# Patient Record
Sex: FEMALE | Race: WHITE | NOT HISPANIC OR LATINO | URBAN - METROPOLITAN AREA
[De-identification: names, ages, dates, MRNs, and addresses within clinical notes are randomized per-mention and may not be internally consistent; named-entity substitution may affect disease eponyms.]

---

## 2017-11-29 ENCOUNTER — APPOINTMENT (OUTPATIENT)
Dept: WOMENS IMAGING | Facility: HOSPITAL | Age: 72
End: 2017-11-29

## 2017-11-29 PROCEDURE — 77063 BREAST TOMOSYNTHESIS BI: CPT | Performed by: RADIOLOGY

## 2017-11-29 PROCEDURE — 77067 SCR MAMMO BI INCL CAD: CPT | Performed by: RADIOLOGY

## 2018-12-12 ENCOUNTER — APPOINTMENT (OUTPATIENT)
Dept: WOMENS IMAGING | Facility: HOSPITAL | Age: 73
End: 2018-12-12

## 2018-12-12 PROCEDURE — 77063 BREAST TOMOSYNTHESIS BI: CPT | Performed by: RADIOLOGY

## 2018-12-12 PROCEDURE — 77067 SCR MAMMO BI INCL CAD: CPT | Performed by: RADIOLOGY

## 2019-12-16 ENCOUNTER — APPOINTMENT (OUTPATIENT)
Dept: WOMENS IMAGING | Facility: HOSPITAL | Age: 74
End: 2019-12-16

## 2019-12-16 PROCEDURE — 77067 SCR MAMMO BI INCL CAD: CPT | Performed by: RADIOLOGY

## 2019-12-16 PROCEDURE — 77063 BREAST TOMOSYNTHESIS BI: CPT | Performed by: RADIOLOGY

## 2025-03-13 ENCOUNTER — APPOINTMENT (OUTPATIENT)
Dept: GENERAL RADIOLOGY | Facility: HOSPITAL | Age: 80
DRG: 194 | End: 2025-03-13
Payer: COMMERCIAL

## 2025-03-13 ENCOUNTER — HOSPITAL ENCOUNTER (INPATIENT)
Facility: HOSPITAL | Age: 80
LOS: 2 days | Discharge: HOME | DRG: 194 | End: 2025-03-16
Attending: STUDENT IN AN ORGANIZED HEALTH CARE EDUCATION/TRAINING PROGRAM | Admitting: STUDENT IN AN ORGANIZED HEALTH CARE EDUCATION/TRAINING PROGRAM
Payer: MEDICARE

## 2025-03-13 DIAGNOSIS — J44.1 ACUTE EXACERBATION OF CHRONIC OBSTRUCTIVE PULMONARY DISEASE (COPD): ICD-10-CM

## 2025-03-13 DIAGNOSIS — J96.21 ACUTE ON CHRONIC RESPIRATORY FAILURE WITH HYPOXIA: ICD-10-CM

## 2025-03-13 DIAGNOSIS — J18.9 PNEUMONIA OF LEFT LOWER LOBE DUE TO INFECTIOUS ORGANISM: Primary | ICD-10-CM

## 2025-03-13 DIAGNOSIS — R47.89 WORD FINDING DIFFICULTY: ICD-10-CM

## 2025-03-13 DIAGNOSIS — N17.9 AKI (ACUTE KIDNEY INJURY): ICD-10-CM

## 2025-03-13 DIAGNOSIS — R54 AGE-RELATED PHYSICAL DEBILITY: ICD-10-CM

## 2025-03-13 LAB
ALBUMIN SERPL-MCNC: 3.8 G/DL (ref 3.5–5.2)
ALBUMIN/GLOB SERPL: 1.3 G/DL
ALP SERPL-CCNC: 91 U/L (ref 39–117)
ALT SERPL W P-5'-P-CCNC: 12 U/L (ref 1–33)
ANION GAP SERPL CALCULATED.3IONS-SCNC: 11.5 MMOL/L (ref 5–15)
ARTERIAL PATENCY WRIST A: ABNORMAL
AST SERPL-CCNC: 18 U/L (ref 1–32)
ATMOSPHERIC PRESS: 746.9 MMHG
B PARAPERT DNA SPEC QL NAA+PROBE: NOT DETECTED
B PERT DNA SPEC QL NAA+PROBE: NOT DETECTED
BASE EXCESS BLDA CALC-SCNC: 2 MMOL/L (ref 0–2)
BASOPHILS # BLD AUTO: 0.03 10*3/MM3 (ref 0–0.2)
BASOPHILS NFR BLD AUTO: 0.2 % (ref 0–1.5)
BDY SITE: ABNORMAL
BILIRUB SERPL-MCNC: 1.6 MG/DL (ref 0–1.2)
BUN SERPL-MCNC: 34 MG/DL (ref 8–23)
BUN/CREAT SERPL: 23 (ref 7–25)
C PNEUM DNA NPH QL NAA+NON-PROBE: NOT DETECTED
CALCIUM SPEC-SCNC: 8.9 MG/DL (ref 8.6–10.5)
CHLORIDE SERPL-SCNC: 95 MMOL/L (ref 98–107)
CO2 SERPL-SCNC: 26.5 MMOL/L (ref 22–29)
CREAT SERPL-MCNC: 1.48 MG/DL (ref 0.57–1)
D-LACTATE SERPL-SCNC: 1.6 MMOL/L (ref 0.5–2)
DEPRECATED RDW RBC AUTO: 48.2 FL (ref 37–54)
EGFRCR SERPLBLD CKD-EPI 2021: 36.3 ML/MIN/1.73
EOSINOPHIL # BLD AUTO: 0.06 10*3/MM3 (ref 0–0.4)
EOSINOPHIL NFR BLD AUTO: 0.3 % (ref 0.3–6.2)
ERYTHROCYTE [DISTWIDTH] IN BLOOD BY AUTOMATED COUNT: 14 % (ref 12.3–15.4)
FLUAV SUBTYP SPEC NAA+PROBE: NOT DETECTED
FLUBV RNA ISLT QL NAA+PROBE: NOT DETECTED
GAS FLOW AIRWAY: 3 LPM
GEN 5 1HR TROPONIN T REFLEX: 41 NG/L
GLOBULIN UR ELPH-MCNC: 2.9 GM/DL
GLUCOSE BLDC GLUCOMTR-MCNC: 138 MG/DL (ref 70–130)
GLUCOSE SERPL-MCNC: 137 MG/DL (ref 65–99)
HADV DNA SPEC NAA+PROBE: NOT DETECTED
HCO3 BLDA-SCNC: 25.6 MMOL/L (ref 22–28)
HCOV 229E RNA SPEC QL NAA+PROBE: NOT DETECTED
HCOV HKU1 RNA SPEC QL NAA+PROBE: NOT DETECTED
HCOV NL63 RNA SPEC QL NAA+PROBE: NOT DETECTED
HCOV OC43 RNA SPEC QL NAA+PROBE: NOT DETECTED
HCT VFR BLD AUTO: 36.8 % (ref 34–46.6)
HEMODILUTION: NO
HGB BLD-MCNC: 11.6 G/DL (ref 12–15.9)
HMPV RNA NPH QL NAA+NON-PROBE: NOT DETECTED
HOLD SPECIMEN: NORMAL
HOLD SPECIMEN: NORMAL
HPIV1 RNA ISLT QL NAA+PROBE: NOT DETECTED
HPIV2 RNA SPEC QL NAA+PROBE: NOT DETECTED
HPIV3 RNA NPH QL NAA+PROBE: NOT DETECTED
HPIV4 P GENE NPH QL NAA+PROBE: NOT DETECTED
IMM GRANULOCYTES # BLD AUTO: 0.14 10*3/MM3 (ref 0–0.05)
IMM GRANULOCYTES NFR BLD AUTO: 0.8 % (ref 0–0.5)
LYMPHOCYTES # BLD AUTO: 0.6 10*3/MM3 (ref 0.7–3.1)
LYMPHOCYTES NFR BLD AUTO: 3.4 % (ref 19.6–45.3)
M PNEUMO IGG SER IA-ACNC: NOT DETECTED
MCH RBC QN AUTO: 29.5 PG (ref 26.6–33)
MCHC RBC AUTO-ENTMCNC: 31.5 G/DL (ref 31.5–35.7)
MCV RBC AUTO: 93.6 FL (ref 79–97)
MODALITY: ABNORMAL
MONOCYTES # BLD AUTO: 0.85 10*3/MM3 (ref 0.1–0.9)
MONOCYTES NFR BLD AUTO: 4.8 % (ref 5–12)
NEUTROPHILS NFR BLD AUTO: 16.19 10*3/MM3 (ref 1.7–7)
NEUTROPHILS NFR BLD AUTO: 90.5 % (ref 42.7–76)
NRBC BLD AUTO-RTO: 0 /100 WBC (ref 0–0.2)
NT-PROBNP SERPL-MCNC: 3470 PG/ML (ref 0–1800)
PCO2 BLDA: 35.9 MM HG (ref 35–45)
PH BLDA: 7.46 PH UNITS (ref 7.35–7.45)
PLATELET # BLD AUTO: 188 10*3/MM3 (ref 140–450)
PMV BLD AUTO: 10.5 FL (ref 6–12)
PO2 BLDA: 68.7 MM HG (ref 80–100)
POTASSIUM SERPL-SCNC: 3.2 MMOL/L (ref 3.5–5.2)
PROCALCITONIN SERPL-MCNC: 3.49 NG/ML (ref 0–0.25)
PROT SERPL-MCNC: 6.7 G/DL (ref 6–8.5)
RBC # BLD AUTO: 3.93 10*6/MM3 (ref 3.77–5.28)
RHINOVIRUS RNA SPEC NAA+PROBE: NOT DETECTED
RSV RNA NPH QL NAA+NON-PROBE: NOT DETECTED
SAO2 % BLDCOA: 94.5 % (ref 92–98.5)
SARS-COV-2 RNA NPH QL NAA+NON-PROBE: NOT DETECTED
SODIUM SERPL-SCNC: 133 MMOL/L (ref 136–145)
TOTAL RATE: 18 BREATHS/MINUTE
TROPONIN T % DELTA: -5
TROPONIN T NUMERIC DELTA: -2 NG/L
TROPONIN T SERPL HS-MCNC: 43 NG/L
WBC NRBC COR # BLD AUTO: 17.87 10*3/MM3 (ref 3.4–10.8)
WHOLE BLOOD HOLD COAG: NORMAL
WHOLE BLOOD HOLD SPECIMEN: NORMAL

## 2025-03-13 PROCEDURE — 82948 REAGENT STRIP/BLOOD GLUCOSE: CPT

## 2025-03-13 PROCEDURE — 83605 ASSAY OF LACTIC ACID: CPT | Performed by: PHYSICIAN ASSISTANT

## 2025-03-13 PROCEDURE — 93005 ELECTROCARDIOGRAM TRACING: CPT | Performed by: PHYSICIAN ASSISTANT

## 2025-03-13 PROCEDURE — 36600 WITHDRAWAL OF ARTERIAL BLOOD: CPT | Performed by: PHYSICIAN ASSISTANT

## 2025-03-13 PROCEDURE — 25010000002 CEFTRIAXONE PER 250 MG: Performed by: PHYSICIAN ASSISTANT

## 2025-03-13 PROCEDURE — 84145 PROCALCITONIN (PCT): CPT | Performed by: PHYSICIAN ASSISTANT

## 2025-03-13 PROCEDURE — 94799 UNLISTED PULMONARY SVC/PX: CPT

## 2025-03-13 PROCEDURE — 0202U NFCT DS 22 TRGT SARS-COV-2: CPT | Performed by: PHYSICIAN ASSISTANT

## 2025-03-13 PROCEDURE — 84484 ASSAY OF TROPONIN QUANT: CPT | Performed by: PHYSICIAN ASSISTANT

## 2025-03-13 PROCEDURE — 80053 COMPREHEN METABOLIC PANEL: CPT | Performed by: PHYSICIAN ASSISTANT

## 2025-03-13 PROCEDURE — 25010000002 METHYLPREDNISOLONE PER 125 MG: Performed by: PHYSICIAN ASSISTANT

## 2025-03-13 PROCEDURE — 94640 AIRWAY INHALATION TREATMENT: CPT

## 2025-03-13 PROCEDURE — 85025 COMPLETE CBC W/AUTO DIFF WBC: CPT | Performed by: PHYSICIAN ASSISTANT

## 2025-03-13 PROCEDURE — 36415 COLL VENOUS BLD VENIPUNCTURE: CPT

## 2025-03-13 PROCEDURE — 82803 BLOOD GASES ANY COMBINATION: CPT | Performed by: PHYSICIAN ASSISTANT

## 2025-03-13 PROCEDURE — 99285 EMERGENCY DEPT VISIT HI MDM: CPT

## 2025-03-13 PROCEDURE — 99291 CRITICAL CARE FIRST HOUR: CPT

## 2025-03-13 PROCEDURE — 93010 ELECTROCARDIOGRAM REPORT: CPT | Performed by: STUDENT IN AN ORGANIZED HEALTH CARE EDUCATION/TRAINING PROGRAM

## 2025-03-13 PROCEDURE — 87040 BLOOD CULTURE FOR BACTERIA: CPT | Performed by: PHYSICIAN ASSISTANT

## 2025-03-13 PROCEDURE — 71045 X-RAY EXAM CHEST 1 VIEW: CPT

## 2025-03-13 PROCEDURE — 83880 ASSAY OF NATRIURETIC PEPTIDE: CPT | Performed by: PHYSICIAN ASSISTANT

## 2025-03-13 RX ORDER — ALBUTEROL SULFATE 0.83 MG/ML
2.5 SOLUTION RESPIRATORY (INHALATION)
Status: COMPLETED | OUTPATIENT
Start: 2025-03-13 | End: 2025-03-13

## 2025-03-13 RX ORDER — METHYLPREDNISOLONE SODIUM SUCCINATE 125 MG/2ML
125 INJECTION, POWDER, LYOPHILIZED, FOR SOLUTION INTRAMUSCULAR; INTRAVENOUS ONCE
Status: COMPLETED | OUTPATIENT
Start: 2025-03-13 | End: 2025-03-13

## 2025-03-13 RX ORDER — SODIUM CHLORIDE 0.9 % (FLUSH) 0.9 %
10 SYRINGE (ML) INJECTION AS NEEDED
Status: DISCONTINUED | OUTPATIENT
Start: 2025-03-13 | End: 2025-03-16 | Stop reason: HOSPADM

## 2025-03-13 RX ORDER — IPRATROPIUM BROMIDE AND ALBUTEROL SULFATE 2.5; .5 MG/3ML; MG/3ML
3 SOLUTION RESPIRATORY (INHALATION) ONCE
Status: COMPLETED | OUTPATIENT
Start: 2025-03-13 | End: 2025-03-13

## 2025-03-13 RX ADMIN — CEFTRIAXONE SODIUM 1000 MG: 1 INJECTION, POWDER, FOR SOLUTION INTRAMUSCULAR; INTRAVENOUS at 22:51

## 2025-03-13 RX ADMIN — ALBUTEROL SULFATE 2.5 MG: 2.5 SOLUTION RESPIRATORY (INHALATION) at 23:13

## 2025-03-13 RX ADMIN — METHYLPREDNISOLONE SODIUM SUCCINATE 125 MG: 125 INJECTION, POWDER, FOR SOLUTION INTRAMUSCULAR; INTRAVENOUS at 22:52

## 2025-03-13 RX ADMIN — ALBUTEROL SULFATE 2.5 MG: 2.5 SOLUTION RESPIRATORY (INHALATION) at 23:18

## 2025-03-13 RX ADMIN — IPRATROPIUM BROMIDE AND ALBUTEROL SULFATE 3 ML: .5; 3 SOLUTION RESPIRATORY (INHALATION) at 23:12

## 2025-03-14 ENCOUNTER — APPOINTMENT (OUTPATIENT)
Dept: CT IMAGING | Facility: HOSPITAL | Age: 80
DRG: 194 | End: 2025-03-14
Payer: MEDICARE

## 2025-03-14 PROBLEM — E78.5 HYPERLIPIDEMIA: Status: ACTIVE | Noted: 2025-03-14

## 2025-03-14 PROBLEM — I51.89 DIASTOLIC DYSFUNCTION: Status: ACTIVE | Noted: 2024-09-09

## 2025-03-14 PROBLEM — J18.9 ACUTE PNEUMONIA: Status: ACTIVE | Noted: 2025-03-14

## 2025-03-14 PROBLEM — Z95.5 S/P CORONARY ARTERY STENT PLACEMENT: Status: ACTIVE | Noted: 2017-04-10

## 2025-03-14 PROBLEM — I25.10 CAD (CORONARY ARTERY DISEASE), NATIVE CORONARY ARTERY: Status: ACTIVE | Noted: 2018-01-08

## 2025-03-14 PROBLEM — I25.2 HISTORY OF ST ELEVATION MYOCARDIAL INFARCTION (STEMI): Status: ACTIVE | Noted: 2017-04-08

## 2025-03-14 PROBLEM — J44.9 COPD (CHRONIC OBSTRUCTIVE PULMONARY DISEASE): Status: ACTIVE | Noted: 2023-12-04

## 2025-03-14 PROBLEM — J96.11 CHRONIC RESPIRATORY FAILURE WITH HYPOXIA: Status: ACTIVE | Noted: 2022-08-16

## 2025-03-14 LAB
ANION GAP SERPL CALCULATED.3IONS-SCNC: 14.2 MMOL/L (ref 5–15)
BUN SERPL-MCNC: 30 MG/DL (ref 8–23)
BUN/CREAT SERPL: 26.5 (ref 7–25)
CALCIUM SPEC-SCNC: 8.4 MG/DL (ref 8.6–10.5)
CHLORIDE SERPL-SCNC: 100 MMOL/L (ref 98–107)
CO2 SERPL-SCNC: 22.8 MMOL/L (ref 22–29)
CREAT SERPL-MCNC: 1.13 MG/DL (ref 0.57–1)
DEPRECATED RDW RBC AUTO: 47.5 FL (ref 37–54)
EGFRCR SERPLBLD CKD-EPI 2021: 50.2 ML/MIN/1.73
ERYTHROCYTE [DISTWIDTH] IN BLOOD BY AUTOMATED COUNT: 14 % (ref 12.3–15.4)
GLUCOSE SERPL-MCNC: 189 MG/DL (ref 65–99)
HCT VFR BLD AUTO: 33.5 % (ref 34–46.6)
HGB BLD-MCNC: 10.5 G/DL (ref 12–15.9)
MCH RBC QN AUTO: 29.1 PG (ref 26.6–33)
MCHC RBC AUTO-ENTMCNC: 31.3 G/DL (ref 31.5–35.7)
MCV RBC AUTO: 92.8 FL (ref 79–97)
PLATELET # BLD AUTO: 166 10*3/MM3 (ref 140–450)
PMV BLD AUTO: 10.4 FL (ref 6–12)
POTASSIUM SERPL-SCNC: 3.2 MMOL/L (ref 3.5–5.2)
QT INTERVAL: 435 MS
QTC INTERVAL: 453 MS
RBC # BLD AUTO: 3.61 10*6/MM3 (ref 3.77–5.28)
SODIUM SERPL-SCNC: 137 MMOL/L (ref 136–145)
TROPONIN T SERPL HS-MCNC: 32 NG/L
WBC NRBC COR # BLD AUTO: 15.56 10*3/MM3 (ref 3.4–10.8)

## 2025-03-14 PROCEDURE — 94799 UNLISTED PULMONARY SVC/PX: CPT

## 2025-03-14 PROCEDURE — 25810000003 LACTATED RINGERS SOLUTION: Performed by: PHYSICIAN ASSISTANT

## 2025-03-14 PROCEDURE — 25510000001 IOPAMIDOL PER 1 ML: Performed by: STUDENT IN AN ORGANIZED HEALTH CARE EDUCATION/TRAINING PROGRAM

## 2025-03-14 PROCEDURE — 84484 ASSAY OF TROPONIN QUANT: CPT | Performed by: NURSE PRACTITIONER

## 2025-03-14 PROCEDURE — 80048 BASIC METABOLIC PNL TOTAL CA: CPT | Performed by: NURSE PRACTITIONER

## 2025-03-14 PROCEDURE — 94761 N-INVAS EAR/PLS OXIMETRY MLT: CPT

## 2025-03-14 PROCEDURE — 94664 DEMO&/EVAL PT USE INHALER: CPT

## 2025-03-14 PROCEDURE — 70498 CT ANGIOGRAPHY NECK: CPT

## 2025-03-14 PROCEDURE — 25010000002 CEFTRIAXONE PER 250 MG: Performed by: STUDENT IN AN ORGANIZED HEALTH CARE EDUCATION/TRAINING PROGRAM

## 2025-03-14 PROCEDURE — 70496 CT ANGIOGRAPHY HEAD: CPT

## 2025-03-14 PROCEDURE — 85027 COMPLETE CBC AUTOMATED: CPT | Performed by: NURSE PRACTITIONER

## 2025-03-14 RX ORDER — ISOSORBIDE MONONITRATE 30 MG/1
30 TABLET, EXTENDED RELEASE ORAL DAILY
COMMUNITY
Start: 2025-03-10

## 2025-03-14 RX ORDER — OLMESARTAN MEDOXOMIL 20 MG/1
20 TABLET ORAL DAILY
COMMUNITY

## 2025-03-14 RX ORDER — SODIUM CHLORIDE 0.9 % (FLUSH) 0.9 %
10 SYRINGE (ML) INJECTION EVERY 12 HOURS SCHEDULED
Status: DISCONTINUED | OUTPATIENT
Start: 2025-03-14 | End: 2025-03-16 | Stop reason: HOSPADM

## 2025-03-14 RX ORDER — LEVOTHYROXINE SODIUM 125 UG/1
125 TABLET ORAL DAILY
Status: DISCONTINUED | OUTPATIENT
Start: 2025-03-14 | End: 2025-03-16 | Stop reason: HOSPADM

## 2025-03-14 RX ORDER — CARVEDILOL 3.12 MG/1
3.12 TABLET ORAL 2 TIMES DAILY WITH MEALS
COMMUNITY

## 2025-03-14 RX ORDER — ISOSORBIDE MONONITRATE 30 MG/1
30 TABLET, EXTENDED RELEASE ORAL DAILY
Status: DISCONTINUED | OUTPATIENT
Start: 2025-03-14 | End: 2025-03-16 | Stop reason: HOSPADM

## 2025-03-14 RX ORDER — IOPAMIDOL 755 MG/ML
100 INJECTION, SOLUTION INTRAVASCULAR
Status: COMPLETED | OUTPATIENT
Start: 2025-03-14 | End: 2025-03-14

## 2025-03-14 RX ORDER — AMOXICILLIN 250 MG
2 CAPSULE ORAL 2 TIMES DAILY PRN
Status: DISCONTINUED | OUTPATIENT
Start: 2025-03-14 | End: 2025-03-16 | Stop reason: HOSPADM

## 2025-03-14 RX ORDER — CARVEDILOL 6.25 MG/1
3.12 TABLET ORAL 2 TIMES DAILY WITH MEALS
Status: DISCONTINUED | OUTPATIENT
Start: 2025-03-14 | End: 2025-03-16 | Stop reason: HOSPADM

## 2025-03-14 RX ORDER — ACETAMINOPHEN 160 MG/5ML
650 SOLUTION ORAL EVERY 4 HOURS PRN
Status: DISCONTINUED | OUTPATIENT
Start: 2025-03-14 | End: 2025-03-16 | Stop reason: HOSPADM

## 2025-03-14 RX ORDER — ATORVASTATIN CALCIUM 40 MG/1
40 TABLET, FILM COATED ORAL DAILY
COMMUNITY

## 2025-03-14 RX ORDER — ATORVASTATIN CALCIUM 20 MG/1
40 TABLET, FILM COATED ORAL DAILY
Status: DISCONTINUED | OUTPATIENT
Start: 2025-03-14 | End: 2025-03-16 | Stop reason: HOSPADM

## 2025-03-14 RX ORDER — MULTIVIT-MIN/FERROUS GLUCONATE 9 MG/15 ML
LIQUID (ML) ORAL DAILY
COMMUNITY

## 2025-03-14 RX ORDER — IPRATROPIUM BROMIDE AND ALBUTEROL SULFATE 2.5; .5 MG/3ML; MG/3ML
3 SOLUTION RESPIRATORY (INHALATION) EVERY 4 HOURS PRN
Status: DISCONTINUED | OUTPATIENT
Start: 2025-03-14 | End: 2025-03-16 | Stop reason: HOSPADM

## 2025-03-14 RX ORDER — SODIUM CHLORIDE 9 MG/ML
40 INJECTION, SOLUTION INTRAVENOUS AS NEEDED
Status: DISCONTINUED | OUTPATIENT
Start: 2025-03-14 | End: 2025-03-16 | Stop reason: HOSPADM

## 2025-03-14 RX ORDER — IPRATROPIUM BROMIDE AND ALBUTEROL SULFATE 2.5; .5 MG/3ML; MG/3ML
3 SOLUTION RESPIRATORY (INHALATION)
Status: DISCONTINUED | OUTPATIENT
Start: 2025-03-14 | End: 2025-03-16 | Stop reason: HOSPADM

## 2025-03-14 RX ORDER — ASPIRIN 81 MG/1
81 TABLET ORAL DAILY
Status: DISCONTINUED | OUTPATIENT
Start: 2025-03-14 | End: 2025-03-16 | Stop reason: HOSPADM

## 2025-03-14 RX ORDER — ONDANSETRON 4 MG/1
4 TABLET, ORALLY DISINTEGRATING ORAL EVERY 6 HOURS PRN
Status: DISCONTINUED | OUTPATIENT
Start: 2025-03-14 | End: 2025-03-16 | Stop reason: HOSPADM

## 2025-03-14 RX ORDER — POLYETHYLENE GLYCOL 3350 17 G/17G
17 POWDER, FOR SOLUTION ORAL DAILY PRN
Status: DISCONTINUED | OUTPATIENT
Start: 2025-03-14 | End: 2025-03-16 | Stop reason: HOSPADM

## 2025-03-14 RX ORDER — FUROSEMIDE 40 MG/1
40 TABLET ORAL 3 TIMES DAILY
COMMUNITY

## 2025-03-14 RX ORDER — BISACODYL 5 MG/1
5 TABLET, DELAYED RELEASE ORAL DAILY PRN
Status: DISCONTINUED | OUTPATIENT
Start: 2025-03-14 | End: 2025-03-16 | Stop reason: HOSPADM

## 2025-03-14 RX ORDER — LEVOTHYROXINE SODIUM 125 MCG
125 TABLET ORAL DAILY
COMMUNITY

## 2025-03-14 RX ORDER — ACETAMINOPHEN 325 MG/1
650 TABLET ORAL EVERY 4 HOURS PRN
Status: DISCONTINUED | OUTPATIENT
Start: 2025-03-14 | End: 2025-03-16 | Stop reason: HOSPADM

## 2025-03-14 RX ORDER — ONDANSETRON 2 MG/ML
4 INJECTION INTRAMUSCULAR; INTRAVENOUS EVERY 6 HOURS PRN
Status: DISCONTINUED | OUTPATIENT
Start: 2025-03-14 | End: 2025-03-16 | Stop reason: HOSPADM

## 2025-03-14 RX ORDER — LOSARTAN POTASSIUM 50 MG/1
50 TABLET ORAL
Status: DISCONTINUED | OUTPATIENT
Start: 2025-03-14 | End: 2025-03-16 | Stop reason: HOSPADM

## 2025-03-14 RX ORDER — BISACODYL 10 MG
10 SUPPOSITORY, RECTAL RECTAL DAILY PRN
Status: DISCONTINUED | OUTPATIENT
Start: 2025-03-14 | End: 2025-03-16 | Stop reason: HOSPADM

## 2025-03-14 RX ORDER — CALCIUM CARBONATE 500 MG/1
2 TABLET, CHEWABLE ORAL 2 TIMES DAILY PRN
Status: DISCONTINUED | OUTPATIENT
Start: 2025-03-14 | End: 2025-03-16 | Stop reason: HOSPADM

## 2025-03-14 RX ORDER — CLOPIDOGREL BISULFATE 75 MG/1
75 TABLET ORAL DAILY
COMMUNITY

## 2025-03-14 RX ORDER — CLOPIDOGREL BISULFATE 75 MG/1
75 TABLET ORAL DAILY
Status: DISCONTINUED | OUTPATIENT
Start: 2025-03-14 | End: 2025-03-16 | Stop reason: HOSPADM

## 2025-03-14 RX ORDER — FUROSEMIDE 40 MG/1
40 TABLET ORAL 3 TIMES DAILY
Status: DISCONTINUED | OUTPATIENT
Start: 2025-03-14 | End: 2025-03-15

## 2025-03-14 RX ORDER — SODIUM CHLORIDE 0.9 % (FLUSH) 0.9 %
10 SYRINGE (ML) INJECTION AS NEEDED
Status: DISCONTINUED | OUTPATIENT
Start: 2025-03-14 | End: 2025-03-16 | Stop reason: HOSPADM

## 2025-03-14 RX ORDER — ACETAMINOPHEN 650 MG/1
650 SUPPOSITORY RECTAL EVERY 4 HOURS PRN
Status: DISCONTINUED | OUTPATIENT
Start: 2025-03-14 | End: 2025-03-16 | Stop reason: HOSPADM

## 2025-03-14 RX ORDER — ASPIRIN 81 MG/1
81 TABLET ORAL DAILY
COMMUNITY
Start: 2024-11-19 | End: 2025-05-18

## 2025-03-14 RX ADMIN — LEVOTHYROXINE SODIUM 125 MCG: 125 TABLET ORAL at 14:04

## 2025-03-14 RX ADMIN — CARVEDILOL 3.12 MG: 6.25 TABLET, FILM COATED ORAL at 20:32

## 2025-03-14 RX ADMIN — Medication 10 ML: at 08:09

## 2025-03-14 RX ADMIN — Medication 10 ML: at 20:32

## 2025-03-14 RX ADMIN — SODIUM CHLORIDE, SODIUM LACTATE, POTASSIUM CHLORIDE, AND CALCIUM CHLORIDE 500 ML: .6; .31; .03; .02 INJECTION, SOLUTION INTRAVENOUS at 00:35

## 2025-03-14 RX ADMIN — LOSARTAN POTASSIUM 50 MG: 50 TABLET, FILM COATED ORAL at 14:04

## 2025-03-14 RX ADMIN — CARVEDILOL 3.12 MG: 6.25 TABLET, FILM COATED ORAL at 14:05

## 2025-03-14 RX ADMIN — ATORVASTATIN CALCIUM 40 MG: 20 TABLET, FILM COATED ORAL at 14:03

## 2025-03-14 RX ADMIN — ISOSORBIDE MONONITRATE 30 MG: 30 TABLET, EXTENDED RELEASE ORAL at 14:04

## 2025-03-14 RX ADMIN — CEFTRIAXONE 2000 MG: 2 INJECTION, POWDER, FOR SOLUTION INTRAMUSCULAR; INTRAVENOUS at 14:03

## 2025-03-14 RX ADMIN — Medication 10 ML: at 14:03

## 2025-03-14 RX ADMIN — ASPIRIN 81 MG: 81 TABLET, COATED ORAL at 14:04

## 2025-03-14 RX ADMIN — IOPAMIDOL 95 ML: 755 INJECTION, SOLUTION INTRAVENOUS at 00:49

## 2025-03-14 RX ADMIN — CLOPIDOGREL BISULFATE 75 MG: 75 TABLET ORAL at 14:03

## 2025-03-14 RX ADMIN — IPRATROPIUM BROMIDE AND ALBUTEROL SULFATE 3 ML: .5; 3 SOLUTION RESPIRATORY (INHALATION) at 19:38

## 2025-03-14 RX ADMIN — IPRATROPIUM BROMIDE AND ALBUTEROL SULFATE 3 ML: .5; 3 SOLUTION RESPIRATORY (INHALATION) at 07:57

## 2025-03-14 NOTE — ED PROVIDER NOTES
MD ATTESTATION NOTE    The JOANNE and I have discussed this patient's history, physical exam, MDM, and treatment plan.  I have reviewed the documentation and personally had a face to face interaction with the patient. I affirm the documentation and agree with the treatment and plan.  The attached note describes my personal findings.      I provided a substantive portion of the medical decision making.        Brief HPI: 79-year-old female, history of COPD on oxygen at night, presents ED for evaluation of possible strokelike symptoms.  She reports she has had some increased cough and congestion difficulty breathing for the last 2 days.  Has some pain in the left side of her chest.    PHYSICAL EXAM  ED Triage Vitals [03/13/25 2147]   Temp Heart Rate Resp BP SpO2   (!) 100.8 °F (38.2 °C) 69 24 135/58 (!) 86 %      Temp src Heart Rate Source Patient Position BP Location FiO2 (%)   Oral Monitor Sitting Right arm --         GENERAL: no acute distress, febrile  HENT: nares patent  EYES: no scleral icterus  CV: regular rhythm, normal rate  RESPIRATORY: normal effort, clear to auscultation bilaterally  ABDOMEN: soft  MUSCULOSKELETAL: no deformity  NEURO: alert, moves all extremities, follows commands  PSYCH:  calm, cooperative  SKIN: warm, dry    Vital signs and nursing notes reviewed.        Medical Decision Making:  ED Course as of 03/14/25 0240   u Mar 13, 2025   2153 I discussed patient with Dr. Restrepo, stroke neurology.  Patient does have some mild aphasia, occasional word finding difficulty, NIH of 1, last known well 4 PM.  Not a thrombolytic candidate due to nondebilitating symptoms, last known well, 6 hours ago.  I suspect her symptoms are metabolic as she is febrile and hypoxic as well.  Routine CTA head and neck recommended, once admitted he recommends she have MRI with and without contrast [KA]   2202 ECG 12 Lead Dyspnea  EKG reviewed, sinus rhythm, rate 65, LAD, LAFB with nonspecific IVCD, no significant T ST  changes, no STEMI, interpreted by self [DN]   2219 WBC(!): 17.87 [KA]   2219 Hemoglobin(!): 11.6 [KA]   2220 My independent interpretation of the chest x-ray is left lower lobe infiltrate [KA]   2223 Patient has a left lower lobe pneumonia, a blood cell count of 17,000, febrile and hypoxic, will plan to admit.  IV antibiotics ordered as well as nebs and steroids [KA]   2315 Creatinine(!): 1.48  1.1 in June 2024 [KA]   2315 pH, Arterial(!): 7.461 [KA]   2315 pCO2, Arterial: 35.9 [KA]   2315 pO2, Arterial(!): 68.7 [KA]   2315 proBNP(!): 3,470.0 [KA]   2315 Procalcitonin(!): 3.49 [KA]   2315 Lactate: 1.6 [KA]   2315 HS Troponin T(!): 43 [KA]   Fri Mar 14, 2025   0003 Full course of care assumed by Dr. Treviño at this time pending CTA's and admission.  [KA]   0126 CT Angiogram Head  I reviewed patient's CT image(s), no obvious ICH or LVO, interpreted by self [DN]   0240 I discussed patient with on-call JOANNE for [facility], agreeable with plan to admit   [DN]      ED Course User Index  [DN] Zuhair Treviño MD  [KA] Shanna Mcgovern PA-C Nichols, Dylan J, MD  03/13/25 2239       Zuhair Treviño MD  03/14/25 0240

## 2025-03-14 NOTE — ED PROVIDER NOTES
EMERGENCY DEPARTMENT ENCOUNTER  Room Number:  04/04  PCP: Provider, No Known  Independent Historians: Patient and EMS      HPI:  Chief Complaint: had concerns including Weakness - Generalized.     A complete HPI/ROS/PMH/PSH/SH/FH are unobtainable due to: None    Chronic or social conditions impacting patient care (Social Determinants of Health): None      Context: The patient is a 79 y.o. female with a medical history of COPD, CAD, CHF, hypertension, hyperlipidemia who presents to the ED c/o acute facial droop and slurred speech.  She arrives via EMS from home, last known well was 4 PM.  EMS did not appreciate any facial droop or slurred speech on their arrival but she has had some intermittent word finding difficulty.  She was also noted to be hypoxic, 89% on room air initially, she does wear 3 L of oxygen at night only per her report.  Additionally she was febrile to 100.9.  Patient states she has had cough congestion for last 2 days, has not noticed any increased shortness of breath.  She denies any headache, chest pain, nausea vomiting vision changes, numbness tingling or weakness.      Review of prior external notes (non-ED) -and- Review of prior external test results outside of this encounter:  Labs performed 6/17/2024 and sodium 144, potassium 4.2, creatinine 1.12        PAST MEDICAL HISTORY  Active Ambulatory Problems     Diagnosis Date Noted    No Active Ambulatory Problems     Resolved Ambulatory Problems     Diagnosis Date Noted    No Resolved Ambulatory Problems     No Additional Past Medical History         PAST SURGICAL HISTORY  No past surgical history on file.      FAMILY HISTORY  No family history on file.      SOCIAL HISTORY  Social History     Socioeconomic History    Marital status:          ALLERGIES  Patient has no known allergies.      REVIEW OF SYSTEMS  Review of Systems  Included in HPI  All systems reviewed and negative except for those discussed in HPI.      PHYSICAL EXAM    I have  reviewed the triage vital signs and nursing notes.    ED Triage Vitals   Temp Pulse Resp BP SpO2   -- -- -- -- --      Temp src Heart Rate Source Patient Position BP Location FiO2 (%)   -- -- -- -- --       Physical Exam  GENERAL: alert, mild distress, mild tachypnea  SKIN: Warm, dry  HENT: Normocephalic, atraumatic  EYES: no scleral icterus  CV: regular rhythm, regular rate  RESPIRATORY: Mild increased effort, tachypnea, left lower lobe crackles, otherwise diffusely diminished  ABDOMEN: nondistended, soft nontender  MUSCULOSKELETAL: no deformity, trace bilateral lower extremity edema  NEURO: NIHSS:  0-->Alert: keenly responsive  0-->Answers both questions correctly  0-->Performs both tasks correctly  0=normal  0=No visual loss  0=Normal symmetric movement  0-->No drift: limb holds 90 (or 45) degrees for full 10 secs  0-->No drift: limb holds 90 (or 45) degrees for full 10 secs  0-->No drift: limb holds 90 (or 45) degrees for full 10 secs  0-->No drift: limb holds 90 (or 45) degrees for full 10 secs  0=Absent  0=Normal; no sensory loss  1-->Mild-to-moderate aphasia: some obvious loss of fluency or facility of comprehension, without significant limitation on ideas expressed or form of expression. Reduction of speech and/or comprehension, however, makes conversation.  0=Normal  0=No abnormality  Total score: 1              LAB RESULTS  Recent Results (from the past 24 hours)   POC Glucose Once    Collection Time: 03/13/25  9:47 PM    Specimen: Blood   Result Value Ref Range    Glucose 138 (H) 70 - 130 mg/dL   Comprehensive Metabolic Panel    Collection Time: 03/13/25  9:56 PM    Specimen: Blood   Result Value Ref Range    Glucose 137 (H) 65 - 99 mg/dL    BUN 34 (H) 8 - 23 mg/dL    Creatinine 1.48 (H) 0.57 - 1.00 mg/dL    Sodium 133 (L) 136 - 145 mmol/L    Potassium 3.2 (L) 3.5 - 5.2 mmol/L    Chloride 95 (L) 98 - 107 mmol/L    CO2 26.5 22.0 - 29.0 mmol/L    Calcium 8.9 8.6 - 10.5 mg/dL    Total Protein 6.7 6.0 - 8.5  g/dL    Albumin 3.8 3.5 - 5.2 g/dL    ALT (SGPT) 12 1 - 33 U/L    AST (SGOT) 18 1 - 32 U/L    Alkaline Phosphatase 91 39 - 117 U/L    Total Bilirubin 1.6 (H) 0.0 - 1.2 mg/dL    Globulin 2.9 gm/dL    A/G Ratio 1.3 g/dL    BUN/Creatinine Ratio 23.0 7.0 - 25.0    Anion Gap 11.5 5.0 - 15.0 mmol/L    eGFR 36.3 (L) >60.0 mL/min/1.73   BNP    Collection Time: 03/13/25  9:56 PM    Specimen: Blood   Result Value Ref Range    proBNP 3,470.0 (H) 0.0 - 1,800.0 pg/mL   High Sensitivity Troponin T    Collection Time: 03/13/25  9:56 PM    Specimen: Blood   Result Value Ref Range    HS Troponin T 43 (H) <14 ng/L   Lactic Acid, Plasma    Collection Time: 03/13/25  9:56 PM    Specimen: Blood   Result Value Ref Range    Lactate 1.6 0.5 - 2.0 mmol/L   Procalcitonin    Collection Time: 03/13/25  9:56 PM    Specimen: Blood   Result Value Ref Range    Procalcitonin 3.49 (H) 0.00 - 0.25 ng/mL   Green Top (Gel)    Collection Time: 03/13/25  9:56 PM   Result Value Ref Range    Extra Tube Hold for add-ons.    Lavender Top    Collection Time: 03/13/25  9:56 PM   Result Value Ref Range    Extra Tube hold for add-on    Gold Top - SST    Collection Time: 03/13/25  9:56 PM   Result Value Ref Range    Extra Tube Hold for add-ons.    Light Blue Top    Collection Time: 03/13/25  9:56 PM   Result Value Ref Range    Extra Tube Hold for add-ons.    CBC Auto Differential    Collection Time: 03/13/25  9:56 PM    Specimen: Blood   Result Value Ref Range    WBC 17.87 (H) 3.40 - 10.80 10*3/mm3    RBC 3.93 3.77 - 5.28 10*6/mm3    Hemoglobin 11.6 (L) 12.0 - 15.9 g/dL    Hematocrit 36.8 34.0 - 46.6 %    MCV 93.6 79.0 - 97.0 fL    MCH 29.5 26.6 - 33.0 pg    MCHC 31.5 31.5 - 35.7 g/dL    RDW 14.0 12.3 - 15.4 %    RDW-SD 48.2 37.0 - 54.0 fl    MPV 10.5 6.0 - 12.0 fL    Platelets 188 140 - 450 10*3/mm3    Neutrophil % 90.5 (H) 42.7 - 76.0 %    Lymphocyte % 3.4 (L) 19.6 - 45.3 %    Monocyte % 4.8 (L) 5.0 - 12.0 %    Eosinophil % 0.3 0.3 - 6.2 %    Basophil % 0.2 0.0  - 1.5 %    Immature Grans % 0.8 (H) 0.0 - 0.5 %    Neutrophils, Absolute 16.19 (H) 1.70 - 7.00 10*3/mm3    Lymphocytes, Absolute 0.60 (L) 0.70 - 3.10 10*3/mm3    Monocytes, Absolute 0.85 0.10 - 0.90 10*3/mm3    Eosinophils, Absolute 0.06 0.00 - 0.40 10*3/mm3    Basophils, Absolute 0.03 0.00 - 0.20 10*3/mm3    Immature Grans, Absolute 0.14 (H) 0.00 - 0.05 10*3/mm3    nRBC 0.0 0.0 - 0.2 /100 WBC   Respiratory Panel PCR w/COVID-19(SARS-CoV-2) KAYLA/RAMU/CHANTELL/PAD/COR/JO ANN In-House, NP Swab in [facility], 2 HR TAT - Swab, Nasopharynx    Collection Time: 03/13/25  9:58 PM    Specimen: Nasopharynx; Swab   Result Value Ref Range    ADENOVIRUS, PCR Not Detected Not Detected    Coronavirus 229E Not Detected Not Detected    Coronavirus HKU1 Not Detected Not Detected    Coronavirus NL63 Not Detected Not Detected    Coronavirus OC43 Not Detected Not Detected    COVID19 Not Detected Not Detected - Ref. Range    Human Metapneumovirus Not Detected Not Detected    Human Rhinovirus/Enterovirus Not Detected Not Detected    Influenza A PCR Not Detected Not Detected    Influenza B PCR Not Detected Not Detected    Parainfluenza Virus 1 Not Detected Not Detected    Parainfluenza Virus 2 Not Detected Not Detected    Parainfluenza Virus 3 Not Detected Not Detected    Parainfluenza Virus 4 Not Detected Not Detected    RSV, PCR Not Detected Not Detected    Bordetella pertussis pcr Not Detected Not Detected    Bordetella parapertussis PCR Not Detected Not Detected    Chlamydophila pneumoniae PCR Not Detected Not Detected    Mycoplasma pneumo by PCR Not Detected Not Detected   ECG 12 Lead Dyspnea    Collection Time: 03/13/25  9:58 PM   Result Value Ref Range    QT Interval 435 ms    QTC Interval 453 ms   High Sensitivity Troponin T 1Hr    Collection Time: 03/13/25 11:04 PM    Specimen: Blood   Result Value Ref Range    HS Troponin T 41 (H) <14 ng/L    Troponin T Numeric Delta -2 ng/L    Troponin T % Delta -5 Abnormal if >/= 20%   Blood Gas, Arterial -     Collection Time: 03/13/25 11:10 PM    Specimen: Arterial Blood   Result Value Ref Range    Site Right Brachial     Daryl's Test N/A     pH, Arterial 7.461 (H) 7.350 - 7.450 pH units    pCO2, Arterial 35.9 35.0 - 45.0 mm Hg    pO2, Arterial 68.7 (L) 80.0 - 100.0 mm Hg    HCO3, Arterial 25.6 22.0 - 28.0 mmol/L    Base Excess, Arterial 2.0 0.0 - 2.0 mmol/L    O2 Saturation, Arterial 94.5 92.0 - 98.5 %    Barometric Pressure for Blood Gas 746.9000 mmHg    Modality Cannula     Flow Rate 3.0000 lpm    Rate 18 Breaths/minute    Hemodilution No          RADIOLOGY  XR Chest 1 View  Result Date: 3/13/2025  SINGLE VIEW OF THE CHEST  HISTORY: CHF/COPD  COMPARISON: None available.  FINDINGS: There is cardiomegaly. There is no vascular congestion. There is some interstitial prominence noted within the left lung, which may reflect some chronic scarring. There is some further scarring suspected at the right lung base. However, there is dense consolidation noted at the left lung base, and probable left pleural effusion. Pneumonia is not excluded.      Left basilar consolidation may reflect pneumonia. A left pleural effusion is also suspected.  This report was finalized on 3/13/2025 10:38 PM by Dr. Mayte Perales M.D on Workstation: [id number]          MEDICATIONS GIVEN IN ER  Medications   sodium chloride 0.9 % flush 10 mL (has no administration in time range)   lactated ringers bolus 500 mL (has no administration in time range)   albuterol (PROVENTIL) nebulizer solution 0.083% 2.5 mg/3mL (2.5 mg Nebulization Given 3/13/25 2318)   ipratropium-albuterol (DUO-NEB) nebulizer solution 3 mL (3 mL Nebulization Given 3/13/25 2312)   cefTRIAXone (ROCEPHIN) 1,000 mg in sodium chloride 0.9 % 100 mL MBP (0 mg Intravenous Stopped 3/13/25 4464)   methylPREDNISolone sodium succinate (SOLU-Medrol) injection 125 mg (125 mg Intravenous Given 3/13/25 9858)         ORDERS PLACED DURING THIS VISIT:  Orders Placed This Encounter   Procedures     Respiratory Panel PCR w/COVID-19(SARS-CoV-2) KAYLA/RAMU/CHANTELL/PAD/COR/JO ANN In-House, NP Swab in UTM/VTM, 2 HR TAT - Swab, Nasopharynx    Blood Culture - Blood,    Blood Culture - Blood,    XR Chest 1 View    CT Angiogram Head    CT Angiogram Neck    Wheeler Draw    Comprehensive Metabolic Panel    BNP    High Sensitivity Troponin T    Lactic Acid, Plasma    Procalcitonin    CBC Auto Differential    Blood Gas, Arterial -    High Sensitivity Troponin T 1Hr    Continuous Pulse Oximetry    Vital Signs    Oxygen Therapy- Nasal Cannula; Titrate 1-6 LPM Per SpO2; 90 - 95%    POC Glucose Once    ECG 12 Lead Dyspnea    Insert Peripheral IV    CBC & Differential    Green Top (Gel)    Lavender Top    Gold Top - SST    Light Blue Top         OUTPATIENT MEDICATION MANAGEMENT:  Current Facility-Administered Medications Ordered in Epic   Medication Dose Route Frequency Provider Last Rate Last Admin    lactated ringers bolus 500 mL  500 mL Intravenous Once Shanna Mcgovern PA-C        sodium chloride 0.9 % flush 10 mL  10 mL Intravenous PRN Shanna Mcgovern PA-C         No current [facility]-ordered outpatient medications on file.         PROCEDURES  Procedures      Critical care provider statement:    Critical care time (minutes): 32.   Critical care time was exclusive of:  Separately billable procedures and treating other patients   Critical care was necessary to treat or prevent imminent or life-threatening deterioration of the following conditions:  CNS Failure and Respiratory Failure   Critical care was time spent personally by me on the following activities:  Development of treatment plan with patient or surrogate, discussions with consultants, evaluation of patient's response to treatment, examination of patient, obtaining history from patient or surrogate, ordering and performing treatments and interventions, ordering and review of laboratory studies, ordering and review of radiographic studies, pulse oximetry, re-evaluation of  patient's condition and review of old charts.     PROGRESS, DATA ANALYSIS, CONSULTS, AND MEDICAL DECISION MAKING  All labs have been independently interpreted by me.  All radiology studies have been reviewed by me. All EKG's have been independently viewed and interpreted by me.  Discussion below represents my analysis of pertinent findings related to patient's condition, differential diagnosis, treatment plan and final disposition.    DIFFERENTIAL    Differential diagnosis includes but is not limited to CHF, acute coronary syndrome, pulmonary embolism, pneumothorax, pneumonia, asthma/COPD, deconditioning, anemia, anxiety.       Clinical Scores:           Total (NIH Stroke Scale): 1      ED Course as of 03/14/25 0005   Thu Mar 13, 2025   2153 I discussed patient with Dr. Restrepo, stroke neurology.  Patient does have some mild aphasia, occasional word finding difficulty, NIH of 1, last known well 4 PM.  Not a thrombolytic candidate due to nondebilitating symptoms, last known well, 6 hours ago.  I suspect her symptoms are metabolic as she is febrile and hypoxic as well.  Routine CTA head and neck recommended, once admitted he recommends she have MRI with and without contrast [KA]   2202 ECG 12 Lead Dyspnea  EKG reviewed, sinus rhythm, rate 65, LAD, LAFB with nonspecific IVCD, no significant T ST changes, no STEMI, interpreted by self [DN]   2219 WBC(!): 17.87 [KA]   2219 Hemoglobin(!): 11.6 [KA]   2220 My independent interpretation of the chest x-ray is left lower lobe infiltrate [KA]   2223 Patient has a left lower lobe pneumonia, a blood cell count of 17,000, febrile and hypoxic, will plan to admit.  IV antibiotics ordered as well as nebs and steroids [KA]   2315 Creatinine(!): 1.48  1.1 in June 2024 [KA]   2315 pH, Arterial(!): 7.461 [KA]   2315 pCO2, Arterial: 35.9 [KA]   2315 pO2, Arterial(!): 68.7 [KA]   2315 proBNP(!): 3,470.0 [KA]   2315 Procalcitonin(!): 3.49 [KA]   2315 Lactate: 1.6 [KA]   2315 HS Troponin  T(!): 43 [KA]   Fri Mar 14, 2025   0003 Full course of care assumed by Dr. Treviño at this time pending CTA's and admission.  [KA]      ED Course User Index  [DN] Zuhair Treviño MD  [KA] Shanna Mcgovern PA-C             AS OF 00:05 EDT VITALS:    BP - 135/58  HR - 62  TEMP - (!) 100.8 °F (38.2 °C) (Oral)  O2 SATS - 91%    COMPLEXITY OF CARE  The patient requires admission.      DIAGNOSIS  Final diagnoses:   Pneumonia of left lower lobe due to infectious organism   Acute on chronic respiratory failure with hypoxia   Acute exacerbation of chronic obstructive pulmonary disease (COPD)   Word finding difficulty   SHANNON (acute kidney injury)         DISPOSITION  ED Disposition       ED Disposition   Intended Admit    Condition   --    Comment   --                              Please note that portions of this document were completed with a voice recognition program.    Note Disclaimer: At [facility], we believe that sharing information builds trust and better relationships. You are receiving this note because you recently visited [facility]. It is possible you will see health information before a provider has talked with you about it. This kind of information can be easy to misunderstand. To help you fully understand what it means for your health, we urge you to discuss this note with your provider.         Shanna Mcgovern PA-C  03/14/25 0005

## 2025-03-14 NOTE — H&P
Patient Name:  Joie Raymundo  YOB: 1945  MRN:  [id number]  Admit Date:  3/13/2025  Patient Care Team:  Provider, No Known as PCP - Beatriz Quintana APRN as Nurse Practitioner (Obstetrics and Gynecology)      Subjective   History Present Illness     Chief Complaint   Patient presents with    Weakness - Generalized     This is a 79-year-old woman with a past medical history of COPD, congestive heart failure, hypertension who presented to hospital with acute facial droop and slurred speech that she noted was discovered by her neighbors.  When EMS got to the scene they apparently did not appreciate any significant drooping or slurred speech but reportedly she had some intermittent word finding difficulty.  She was also noted to be hypoxic and febrile.  She underwent a CT angiogram of the head and neck in the emergency department that was negative for any acute findings.  She was started on antibiotics and admitted for further further evaluation and management.      Personal History     Past Medical History:   Diagnosis Date    COPD (chronic obstructive pulmonary disease)     Disease of thyroid gland     Elevated cholesterol     Hypertension      Past Surgical History:   Procedure Laterality Date    CARDIAC SURGERY       History reviewed. No pertinent family history.  Social History     Tobacco Use    Smoking status: Every Day     Average packs/day: 1 pack/day for 60.0 years (60.0 ttl pk-yrs)     Types: Cigarettes     Start date: 1965    Smokeless tobacco: Never   Vaping Use    Vaping status: Never Used   Substance Use Topics    Alcohol use: Not Currently    Drug use: Never     No current facility-administered medications on file prior to encounter.     Current Outpatient Medications on File Prior to Encounter   Medication Sig Dispense Refill    aspirin 81 MG EC tablet Take 1 tablet by mouth Daily.      isosorbide mononitrate (IMDUR) 30 MG 24 hr tablet Take 1 tablet by mouth Daily.       atorvastatin (LIPITOR) 40 MG tablet Take 1 tablet by mouth Daily.      carvedilol (COREG) 3.125 MG tablet Take 1 tablet by mouth 2 (Two) Times a Day With Meals.      clopidogrel (PLAVIX) 75 MG tablet Take 1 tablet by mouth Daily.      furosemide (LASIX) 40 MG tablet Take 1 tablet by mouth 3 (Three) Times a Day.      Multiple Vitamins-Minerals liquid liquid Take  by mouth Daily.      olmesartan (BENICAR) 20 MG tablet Take 1 tablet by mouth Daily.      Synthroid 125 MCG tablet Take 1 tablet by mouth Daily.       No Known Allergies    Objective    Objective     Vital Signs  Temp:  [97 °F (36.1 °C)-100.8 °F (38.2 °C)] 97 °F (36.1 °C)  Heart Rate:  [59-69] 59  Resp:  [18-24] 20  BP: ()/(46-96) 156/79  SpO2:  [86 %-100 %] 100 %  on  Flow (L/min) (Oxygen Therapy):  [3] 3;   Device (Oxygen Therapy): nasal cannula  Body mass index is 29.71 kg/m².    Physical Exam  Constitutional:       Appearance: Normal appearance.   HENT:      Head: Normocephalic and atraumatic.   Eyes:      Extraocular Movements: Extraocular movements intact.      Pupils: Pupils are equal, round, and reactive to light.   Cardiovascular:      Rate and Rhythm: Normal rate and regular rhythm.   Pulmonary:      Effort: Pulmonary effort is normal. No respiratory distress.   Abdominal:      General: There is no distension.      Palpations: Abdomen is soft.      Tenderness: There is no abdominal tenderness.   Musculoskeletal:         General: No swelling or tenderness.   Skin:     General: Skin is warm and dry.   Neurological:      General: No focal deficit present.      Mental Status: She is alert and oriented to person, place, and time.         Results Review:  I reviewed the patient's new clinical results.  I reviewed the patient's new imaging results and agree with the interpretation.  I reviewed the patient's other test results and agree with the interpretation  I personally viewed and interpreted the patient's EKG/Telemetry data  Discussed with ED  provider.    Lab Results (last 24 hours)       Procedure Component Value Units Date/Time    POC Glucose Once [[id number]]  (Abnormal) Collected: 03/13/25 2147    Specimen: Blood Updated: 03/13/25 2149     Glucose 138 mg/dL     CBC & Differential [[id number]]  (Abnormal) Collected: 03/13/25 2156    Specimen: Blood Updated: 03/13/25 2208    Narrative:      The following orders were created for panel order CBC & Differential.  Procedure                               Abnormality         Status                     ---------                               -----------         ------                     CBC Auto Differential[[id number]]        Abnormal            Final result                 Please view results for these tests on the individual orders.    Comprehensive Metabolic Panel [[id number]]  (Abnormal) Collected: 03/13/25 2156    Specimen: Blood Updated: 03/13/25 2231     Glucose 137 mg/dL      BUN 34 mg/dL      Creatinine 1.48 mg/dL      Sodium 133 mmol/L      Potassium 3.2 mmol/L      Chloride 95 mmol/L      CO2 26.5 mmol/L      Calcium 8.9 mg/dL      Total Protein 6.7 g/dL      Albumin 3.8 g/dL      ALT (SGPT) 12 U/L      AST (SGOT) 18 U/L      Alkaline Phosphatase 91 U/L      Total Bilirubin 1.6 mg/dL      Globulin 2.9 gm/dL      A/G Ratio 1.3 g/dL      BUN/Creatinine Ratio 23.0     Anion Gap 11.5 mmol/L      eGFR 36.3 mL/min/1.73     Narrative:      GFR Categories in Chronic Kidney Disease (CKD)      GFR Category          GFR (mL/min/1.73)    Interpretation  G1                     90 or greater         Normal or high (1)  G2                      60-89                Mild decrease (1)  G3a                   45-59                Mild to moderate decrease  G3b                   30-44                Moderate to severe decrease  G4                    15-29                Severe decrease  G5                    14 or less           Kidney failure          (1)In the absence of evidence of kidney disease, neither GFR category  G1 or G2 fulfill the criteria for CKD.    eGFR calculation 2021 CKD-EPI creatinine equation, which does not include race as a factor    BNP [[id number]]  (Abnormal) Collected: 03/13/25 2156    Specimen: Blood Updated: 03/13/25 2238     proBNP 3,470.0 pg/mL     Narrative:      This assay is used as an aid in the diagnosis of individuals suspected of having heart failure. It can be used as an aid in the diagnosis of acute decompensated heart failure (ADHF) in patients presenting with signs and symptoms of ADHF to the emergency department (ED). In addition, NT-proBNP of <300 pg/mL indicates ADHF is not likely.    Age Range Result Interpretation  NT-proBNP Concentration (pg/mL:      <50             Positive            >450                   Gray                 300-450                    Negative             <300    50-75           Positive            >900                  Gray                300-900                  Negative            <300      >75             Positive            >1800                  Gray                300-1800                  Negative            <300    High Sensitivity Troponin T [[id number]]  (Abnormal) Collected: 03/13/25 2156    Specimen: Blood Updated: 03/13/25 2238     HS Troponin T 43 ng/L     Narrative:      High Sensitive Troponin T Reference Range:  <14.0 ng/L- Negative Female for AMI  <22.0 ng/L- Negative Male for AMI  >=14 - Abnormal Female indicating possible myocardial injury.  >=22 - Abnormal Male indicating possible myocardial injury.   Clinicians would have to utilize clinical acumen, EKG, Troponin, and serial changes to determine if it is an Acute Myocardial Infarction or myocardial injury due to an underlying chronic condition.         Lactic Acid, Plasma [[id number]]  (Normal) Collected: 03/13/25 2156    Specimen: Blood Updated: 03/13/25 2226     Lactate 1.6 mmol/L     Procalcitonin [[id number]]  (Abnormal) Collected: 03/13/25 2156    Specimen: Blood Updated: 03/13/25 2238      "Procalcitonin 3.49 ng/mL     Narrative:      As a Marker for Sepsis (Non-Neonates):    1. <0.5 ng/mL represents a low risk of severe sepsis and/or septic shock.  2. >2 ng/mL represents a high risk of severe sepsis and/or septic shock.    As a Marker for Lower Respiratory Tract Infections that require antibiotic therapy:    PCT on Admission    Antibiotic Therapy       6-12 Hrs later    >0.5                Strongly Recommended  >0.25 - <0.5        Recommended   0.1 - 0.25          Discouraged              Remeasure/reassess PCT  <0.1                Strongly Discouraged     Remeasure/reassess PCT    As 28 day mortality risk marker: \"Change in Procalcitonin Result\" (>80% or <=80%) if Day 0 (or Day 1) and Day 4 values are available. Refer to http://www.[email or web address]-pct-calculator.com    Change in PCT <=80%  A decrease of PCT levels below or equal to 80% defines a positive change in PCT test result representing a higher risk for 28-day all-cause mortality of patients diagnosed with severe sepsis for septic shock.    Change in PCT >80%  A decrease of PCT levels of more than 80% defines a negative change in PCT result representing a lower risk for 28-day all-cause mortality of patients diagnosed with severe sepsis or septic shock.       CBC Auto Differential [[id number]]  (Abnormal) Collected: 03/13/25 2156    Specimen: Blood Updated: 03/13/25 2208     WBC 17.87 10*3/mm3      RBC 3.93 10*6/mm3      Hemoglobin 11.6 g/dL      Hematocrit 36.8 %      MCV 93.6 fL      MCH 29.5 pg      MCHC 31.5 g/dL      RDW 14.0 %      RDW-SD 48.2 fl      MPV 10.5 fL      Platelets 188 10*3/mm3      Neutrophil % 90.5 %      Lymphocyte % 3.4 %      Monocyte % 4.8 %      Eosinophil % 0.3 %      Basophil % 0.2 %      Immature Grans % 0.8 %      Neutrophils, Absolute 16.19 10*3/mm3      Lymphocytes, Absolute 0.60 10*3/mm3      Monocytes, Absolute 0.85 10*3/mm3      Eosinophils, Absolute 0.06 10*3/mm3      Basophils, Absolute 0.03 10*3/mm3      Immature " Grans, Absolute 0.14 10*3/mm3      nRBC 0.0 /100 WBC     Respiratory Panel PCR w/COVID-19(SARS-CoV-2) KAYLA/RAMU/CHANTELL/PAD/COR/JO ANN In-House, NP Swab in UTM/VTM, 2 HR TAT - Swab, Nasopharynx [[id number]]  (Normal) Collected: 03/13/25 2158    Specimen: Swab from Nasopharynx Updated: 03/13/25 2318     ADENOVIRUS, PCR Not Detected     Coronavirus 229E Not Detected     Coronavirus HKU1 Not Detected     Coronavirus NL63 Not Detected     Coronavirus OC43 Not Detected     COVID19 Not Detected     Human Metapneumovirus Not Detected     Human Rhinovirus/Enterovirus Not Detected     Influenza A PCR Not Detected     Influenza B PCR Not Detected     Parainfluenza Virus 1 Not Detected     Parainfluenza Virus 2 Not Detected     Parainfluenza Virus 3 Not Detected     Parainfluenza Virus 4 Not Detected     RSV, PCR Not Detected     Bordetella pertussis pcr Not Detected     Bordetella parapertussis PCR Not Detected     Chlamydophila pneumoniae PCR Not Detected     Mycoplasma pneumo by PCR Not Detected    Narrative:      In the setting of a positive respiratory panel with a viral infection PLUS a negative procalcitonin without other underlying concern for bacterial infection, consider observing off antibiotics or discontinuation of antibiotics and continue supportive care. If the respiratory panel is positive for atypical bacterial infection (Bordetella pertussis, Chlamydophila pneumoniae, or Mycoplasma pneumoniae), consider antibiotic de-escalation to target atypical bacterial infection.    Blood Culture - Blood, Arm, Right [[id number]] Collected: 03/13/25 2212    Specimen: Blood from Arm, Right Updated: 03/13/25 2223    Blood Culture - Blood, Arm, Right [[id number]] Collected: 03/13/25 2218    Specimen: Blood from Arm, Right Updated: 03/13/25 2222    High Sensitivity Troponin T 1Hr [[id number]]  (Abnormal) Collected: 03/13/25 2304    Specimen: Blood Updated: 03/13/25 2333     HS Troponin T 41 ng/L      Troponin T Numeric Delta -2 ng/L       Troponin T % Delta -5    Narrative:      High Sensitive Troponin T Reference Range:  <14.0 ng/L- Negative Female for AMI  <22.0 ng/L- Negative Male for AMI  >=14 - Abnormal Female indicating possible myocardial injury.  >=22 - Abnormal Male indicating possible myocardial injury.   Clinicians would have to utilize clinical acumen, EKG, Troponin, and serial changes to determine if it is an Acute Myocardial Infarction or myocardial injury due to an underlying chronic condition.         Blood Gas, Arterial - [[id number]]  (Abnormal) Collected: 03/13/25 2310    Specimen: Arterial Blood Updated: 03/13/25 2313     Site Right Brachial     Daryl's Test N/A     pH, Arterial 7.461 pH units      pCO2, Arterial 35.9 mm Hg      pO2, Arterial 68.7 mm Hg      HCO3, Arterial 25.6 mmol/L      Base Excess, Arterial 2.0 mmol/L      Comment: Serial Number: [id number]:  [id number]        O2 Saturation, Arterial 94.5 %      Barometric Pressure for Blood Gas 746.9000 mmHg      Modality Cannula     Flow Rate 3.0000 lpm      Rate 18 Breaths/minute      Hemodilution No    Basic Metabolic Panel [[id number]]  (Abnormal) Collected: 03/14/25 0549    Specimen: Blood Updated: 03/14/25 0633     Glucose 189 mg/dL      BUN 30 mg/dL      Creatinine 1.13 mg/dL      Sodium 137 mmol/L      Potassium 3.2 mmol/L      Comment: Slight hemolysis detected by analyzer. Result may be falsely elevated.        Chloride 100 mmol/L      CO2 22.8 mmol/L      Calcium 8.4 mg/dL      BUN/Creatinine Ratio 26.5     Anion Gap 14.2 mmol/L      eGFR 50.2 mL/min/1.73     Narrative:      GFR Categories in Chronic Kidney Disease (CKD)      GFR Category          GFR (mL/min/1.73)    Interpretation  G1                     90 or greater         Normal or high (1)  G2                      60-89                Mild decrease (1)  G3a                   45-59                Mild to moderate decrease  G3b                   30-44                Moderate to severe decrease  G4                     15-29                Severe decrease  G5                    14 or less           Kidney failure          (1)In the absence of evidence of kidney disease, neither GFR category G1 or G2 fulfill the criteria for CKD.    eGFR calculation 2021 CKD-EPI creatinine equation, which does not include race as a factor    CBC (No Diff) [[id number]]  (Abnormal) Collected: 03/14/25 0549    Specimen: Blood Updated: 03/14/25 0605     WBC 15.56 10*3/mm3      RBC 3.61 10*6/mm3      Hemoglobin 10.5 g/dL      Hematocrit 33.5 %      MCV 92.8 fL      MCH 29.1 pg      MCHC 31.3 g/dL      RDW 14.0 %      RDW-SD 47.5 fl      MPV 10.4 fL      Platelets 166 10*3/mm3     High Sensitivity Troponin T [[id number]]  (Abnormal) Collected: 03/14/25 0549    Specimen: Blood Updated: 03/14/25 0628     HS Troponin T 32 ng/L     Narrative:      High Sensitive Troponin T Reference Range:  <14.0 ng/L- Negative Female for AMI  <22.0 ng/L- Negative Male for AMI  >=14 - Abnormal Female indicating possible myocardial injury.  >=22 - Abnormal Male indicating possible myocardial injury.   Clinicians would have to utilize clinical acumen, EKG, Troponin, and serial changes to determine if it is an Acute Myocardial Infarction or myocardial injury due to an underlying chronic condition.                 No results found for this or any previous visit.    Imaging Results (Last 24 Hours)       Procedure Component Value Units Date/Time    CT Angiogram Neck [[id number]] Collected: 03/14/25 0258     Updated: 03/14/25 0258    Narrative:        Patient: MIKE VILLASENOR  Time Out: 02:57  Exam(s): CTA NECK     EXAM:    CT Angiography Neck With Intravenous Contrast    CLINICAL HISTORY:     Reason for exam: word finding difficulty.    TECHNIQUE:    Routine carotid CT angiography protocol was performed with intravenous   contrast.  NASCET criteria using the distal ICAs for comparison were used   for evaluation of stenoses.  CTDI is 42.71 mGy and DLP is 1480.1 mGy-cm.    This  CT exam was performed according to the principle of ALARA (As Low As   Reasonably Achievable) by using one or more of the following dose   reduction techniques: automated exposure control, adjustment of the mA   and or kV according to patient size, and or use of iterative   reconstruction technique.    MIP reconstructed images were created and reviewed.    COMPARISON:    None.    FINDINGS:     VASCULATURE:    Right common carotid artery:  Unremarkable.  No occlusion or   significant stenosis.  No dissection.    Right internal carotid artery:  Unremarkable.  Extracranial segment is   patent with no occlusion or significant stenosis.  No dissection.    Right external carotid artery:  Unremarkable.  No occlusion.    Right vertebral artery:  Unremarkable.  No occlusion or significant   stenosis.  No dissection.      Left common carotid artery:  Unremarkable.  No occlusion or significant   stenosis.  No dissection.    Left internal carotid artery:  Unremarkable.  Extracranial segment is   patent with no occlusion or significant stenosis.  No dissection.    Left external carotid artery:  Unremarkable.  No occlusion.    Left vertebral artery:  Unremarkable.  No occlusion or significant   stenosis.  No dissection.     NECK:    Bones joints:  Unremarkable.  No acute fracture.    Soft tissues: Enlarged mediastinal lymph nodes.  Prominent lingual   tonsils.    Lung apices: Bronchitis with left upper lobe infiltrate.  Small left   pleural effusion.     CAROTID STENOSIS REFERENCE USING NASCET CRITERIA:    % ICA stenosis = (1 - narrowest ICA diameter diameter of distal   cervical ICA) x 100.    Mild - <50% stenosis.    Moderate - 50-69% stenosis.    Severe - 70-94% stenosis.    Near occlusion - 95-99% stenosis.    Occluded - 100% stenosis.    IMPRESSION:         Negative CTA neck.      Impression:          Electronically signed by Anaid Walters MD on 03-14-25 at 0257    CT Angiogram Head [[id number]] Collected: 03/14/25 0255      Updated: 03/14/25 0255    Narrative:        Patient: MIKE VILLASENOR  Time Out: 02:54  Exam(s): CTA HEAD     EXAM:    CT Angiography Head Without and With Intravenous Contrast    CLINICAL HISTORY:     Reason for exam: word finding difficulty.    TECHNIQUE:  AI analysis of LVO was utilized    Axial computed tomographic angiography images of the head without and   with intravenous contrast.  CTDI is 42.71 mGy and DLP is 1480.1 mGy-cm.    This CT exam was performed according to the principle of ALARA (As Low As   Reasonably Achievable) by using one or more of the following dose   reduction techniques: automated exposure control, adjustment of the mA   and or kV according to patient size, and or use of iterative   reconstruction technique.    3D and MIP reconstructed images were created and reviewed.    COMPARISON:    No relevant prior studies available.    FINDINGS:     VASCULATURE: The dural venous sinuses are patent.    Right internal carotid artery:  No acute findings.  Intracranial   segment is patent with no significant stenosis.  No aneurysm.    Right anterior cerebral artery:  Unremarkable.  No occlusion or   significant stenosis.  No aneurysm.    Right middle cerebral artery:  Unremarkable.  No occlusion or   significant stenosis.  No aneurysm.    Right posterior cerebral artery:  Unremarkable.  No occlusion or   significant stenosis.  No aneurysm.    Right vertebral artery:  Unremarkable as visualized.      Left internal carotid artery:  No acute findings.  Intracranial segment   is patent with no significant stenosis.  No aneurysm.    Left anterior cerebral artery:  Unremarkable.  No occlusion or   significant stenosis.  No aneurysm.    Left middle cerebral artery:  Unremarkable.  No occlusion or   significant stenosis.  No aneurysm.    Left posterior cerebral artery:  Unremarkable.  No occlusion or   significant stenosis.  No aneurysm.    Left vertebral artery:  Unremarkable as visualized.      Basilar  artery:  Unremarkable.  No occlusion or significant stenosis.    No aneurysm.     HEAD:    Brain:  No acute findings.  No hemorrhage.  No edema.  Normal   enhancement.    Ventricles:  Unremarkable.  No ventriculomegaly.    Bones joints:  No acute fracture.    Soft tissues:  Unremarkable.    Sinuses:  Unremarkable as visualized.  No acute sinusitis.    Mastoid air cells:  Unremarkable as visualized.  No mastoid effusion.    IMPRESSION:       Negative CT angiogram of the head.      Impression:          Electronically signed by Anaid Walters MD on 03-14-25 at 0254    XR Chest 1 View [[id number]] Collected: 03/13/25 2236     Updated: 03/13/25 2241    Narrative:      SINGLE VIEW OF THE CHEST     HISTORY: CHF/COPD     COMPARISON: None available.     FINDINGS:  There is cardiomegaly. There is no vascular congestion. There is some  interstitial prominence noted within the left lung, which may reflect  some chronic scarring. There is some further scarring suspected at the  right lung base. However, there is dense consolidation noted at the left  lung base, and probable left pleural effusion. Pneumonia is not  excluded.       Impression:      Left basilar consolidation may reflect pneumonia. A left pleural  effusion is also suspected.     This report was finalized on 3/13/2025 10:38 PM by Dr. Mayte Perales M.D on Workstation: [id number]                   ECG 12 Lead Dyspnea   Preliminary Result   HEART RATE=65  bpm   RR Riezvvel=103  ms   MS Llogwuap=546  ms   P Horizontal Axis=  deg   P Front Axis=53  deg   QRSD Awizcxkg=177  ms   QT Gwsuwdsb=948  ms   UAaE=685  ms   QRS Axis=-38  deg   T Wave Axis=21  deg   - ABNORMAL ECG -   Sinus rhythm   Probable left atrial enlargement   Nonspecific IVCD with LAD   Date and Time of Study:2025-03-13 21:58:19                 Assessment/Plan     Active Hospital Problems    Diagnosis  POA    **Acute pneumonia [J18.9]  Yes      Resolved Hospital Problems   No resolved problems to  display.     Acute pneumonia  Continue Rocephin.  Blood cultures obtained. Plan for 5 day course of Abx.    COPD  Not currently in exacerbation.  I do not appreciate any significant wheezing on exam. Continue duonebs.  No need for steroids at this time    Slurred speech  CT angiogram of the head and neck were obtained which were negative.  Neurology was consulted.  MRI of the brain has also been ordered.    Acute kidney injury  Creatinine today 1.48 on presentation.  She was given some IV fluids and is currently down to 1.13.    Coronary artery disease status post PCI  Hypertension  Hyperlipidemia  Currently on losartan, Imdur, Plavix, Coreg, atorvastatin, aspirin.  All these will be continued  She had echocardiogram in June 2024 that showed an ejection fraction of 67%.  She does take Lasix 40 mg daily at home which is currently held due to SHANNON on initial evaluation.     Hypothyroidism  On levothyroxine      I discussed the patient's findings and my recommendations with patient.    VTE Prophylaxis - SCDs.  Code Status - Full code.       Stephen Rodrigez MD  [facility]  03/14/25  13:32 EDT

## 2025-03-14 NOTE — ED NOTES
"Nursing report ED to floor  Joie Raymundo  79 y.o.  female    The patient is a 79 y.o. female with a medical history of COPD, CAD, CHF, hypertension, hyperlipidemia who presents to the ED c/o acute facial droop and slurred speech.  She arrives via EMS from home, last known well was 4 PM.  EMS did not appreciate any facial droop or slurred speech on their arrival but she has had some intermittent word finding difficulty.  She was also noted to be hypoxic, 89% on room air initially, she does wear 3 L of oxygen at night only per her report.  Additionally she was febrile to 100.9.  Patient states she has had cough congestion for last 2 days, has not noticed any increased shortness of breath.  She denies any headache, chest pain, nausea vomiting vision changes, numbness tingling or weakness.     HPI :  HPI  Stated Reason for Visit: Pt presents to ED from home after family called for concerns of facial droop (unsure which side), slurred speech and unsteady gait. Those symptoms have resolved at this time but pt appears to be pursed lip breathing upon arrival. Pt denies SOA and states \"I am not anymore SOA than usual.\" Pt has a history of COPD and reports wearing 3L/min via nasal canula at night.  History Obtained From: patient, EMS    Chief Complaint  Chief Complaint   Patient presents with    Weakness - Generalized       Admitting doctor:   Marry Javier MD    Admitting diagnosis:   The primary encounter diagnosis was Pneumonia of left lower lobe due to infectious organism. Diagnoses of Acute on chronic respiratory failure with hypoxia, Acute exacerbation of chronic obstructive pulmonary disease (COPD), Word finding difficulty, and SHANNON (acute kidney injury) were also pertinent to this visit.    Code status:   Current Code Status       Date Active Code Status Order ID Comments User Context       Not on file            Allergies:   Patient has no known allergies.    Isolation:   No active isolations    Intake and " Output    Intake/Output Summary (Last 24 hours) at 3/14/2025 0247  Last data filed at 3/13/2025 2327  Gross per 24 hour   Intake 100 ml   Output --   Net 100 ml       Weight:       03/13/25  2153   Weight: 68 kg (150 lb)       Most recent vitals:   Vitals:    03/13/25 2338 03/14/25 0001 03/14/25 0033 03/14/25 0131   BP:  95/49 98/49 99/49   BP Location:       Patient Position:       Pulse:  67 67 61   Resp:       Temp:       TempSrc:       SpO2: 93% 92%     Weight:       Height:           Active LDAs/IV Access:   Lines, Drains & Airways       Active LDAs       Name Placement date Placement time Site Days    Peripheral IV 03/13/25 2207 Anterior;Left Forearm 03/13/25 2207  Forearm  less than 1                    Labs (abnormal labs have a star):   Labs Reviewed   COMPREHENSIVE METABOLIC PANEL - Abnormal; Notable for the following components:       Result Value    Glucose 137 (*)     BUN 34 (*)     Creatinine 1.48 (*)     Sodium 133 (*)     Potassium 3.2 (*)     Chloride 95 (*)     Total Bilirubin 1.6 (*)     eGFR 36.3 (*)     All other components within normal limits    Narrative:     GFR Categories in Chronic Kidney Disease (CKD)      GFR Category          GFR (mL/min/1.73)    Interpretation  G1                     90 or greater         Normal or high (1)  G2                      60-89                Mild decrease (1)  G3a                   45-59                Mild to moderate decrease  G3b                   30-44                Moderate to severe decrease  G4                    15-29                Severe decrease  G5                    14 or less           Kidney failure          (1)In the absence of evidence of kidney disease, neither GFR category G1 or G2 fulfill the criteria for CKD.    eGFR calculation 2021 CKD-EPI creatinine equation, which does not include race as a factor   BNP (IN-HOUSE) - Abnormal; Notable for the following components:    proBNP 3,470.0 (*)     All other components within normal limits     Narrative:     This assay is used as an aid in the diagnosis of individuals suspected of having heart failure. It can be used as an aid in the diagnosis of acute decompensated heart failure (ADHF) in patients presenting with signs and symptoms of ADHF to the emergency department (ED). In addition, NT-proBNP of <300 pg/mL indicates ADHF is not likely.    Age Range Result Interpretation  NT-proBNP Concentration (pg/mL:      <50             Positive            >450                   Gray                 300-450                    Negative             <300    50-75           Positive            >900                  Gray                300-900                  Negative            <300      >75             Positive            >1800                  Gray                300-1800                  Negative            <300   TROPONIN - Abnormal; Notable for the following components:    HS Troponin T 43 (*)     All other components within normal limits    Narrative:     High Sensitive Troponin T Reference Range:  <14.0 ng/L- Negative Female for AMI  <22.0 ng/L- Negative Male for AMI  >=14 - Abnormal Female indicating possible myocardial injury.  >=22 - Abnormal Male indicating possible myocardial injury.   Clinicians would have to utilize clinical acumen, EKG, Troponin, and serial changes to determine if it is an Acute Myocardial Infarction or myocardial injury due to an underlying chronic condition.        PROCALCITONIN - Abnormal; Notable for the following components:    Procalcitonin 3.49 (*)     All other components within normal limits    Narrative:     As a Marker for Sepsis (Non-Neonates):    1. <0.5 ng/mL represents a low risk of severe sepsis and/or septic shock.  2. >2 ng/mL represents a high risk of severe sepsis and/or septic shock.    As a Marker for Lower Respiratory Tract Infections that require antibiotic therapy:    PCT on Admission    Antibiotic Therapy       6-12 Hrs later    >0.5                Strongly  "Recommended  >0.25 - <0.5        Recommended   0.1 - 0.25          Discouraged              Remeasure/reassess PCT  <0.1                Strongly Discouraged     Remeasure/reassess PCT    As 28 day mortality risk marker: \"Change in Procalcitonin Result\" (>80% or <=80%) if Day 0 (or Day 1) and Day 4 values are available. Refer to http://www.[facility]-pct-calculator.com    Change in PCT <=80%  A decrease of PCT levels below or equal to 80% defines a positive change in PCT test result representing a higher risk for 28-day all-cause mortality of patients diagnosed with severe sepsis for septic shock.    Change in PCT >80%  A decrease of PCT levels of more than 80% defines a negative change in PCT result representing a lower risk for 28-day all-cause mortality of patients diagnosed with severe sepsis or septic shock.      CBC WITH AUTO DIFFERENTIAL - Abnormal; Notable for the following components:    WBC 17.87 (*)     Hemoglobin 11.6 (*)     Neutrophil % 90.5 (*)     Lymphocyte % 3.4 (*)     Monocyte % 4.8 (*)     Immature Grans % 0.8 (*)     Neutrophils, Absolute 16.19 (*)     Lymphocytes, Absolute 0.60 (*)     Immature Grans, Absolute 0.14 (*)     All other components within normal limits   BLOOD GAS, ARTERIAL - Abnormal; Notable for the following components:    pH, Arterial 7.461 (*)     pO2, Arterial 68.7 (*)     All other components within normal limits   HIGH SENSITIVITIY TROPONIN T 1HR - Abnormal; Notable for the following components:    HS Troponin T 41 (*)     All other components within normal limits    Narrative:     High Sensitive Troponin T Reference Range:  <14.0 ng/L- Negative Female for AMI  <22.0 ng/L- Negative Male for AMI  >=14 - Abnormal Female indicating possible myocardial injury.  >=22 - Abnormal Male indicating possible myocardial injury.   Clinicians would have to utilize clinical acumen, EKG, Troponin, and serial changes to determine if it is an Acute Myocardial Infarction or myocardial injury due " to an underlying chronic condition.        POCT GLUCOSE FINGERSTICK - Abnormal; Notable for the following components:    Glucose 138 (*)     All other components within normal limits   RESPIRATORY PANEL PCR W/ COVID-19 (SARS-COV-2), NP SWAB IN UTM/VTP, 2 HR TAT - Normal    Narrative:     In the setting of a positive respiratory panel with a viral infection PLUS a negative procalcitonin without other underlying concern for bacterial infection, consider observing off antibiotics or discontinuation of antibiotics and continue supportive care. If the respiratory panel is positive for atypical bacterial infection (Bordetella pertussis, Chlamydophila pneumoniae, or Mycoplasma pneumoniae), consider antibiotic de-escalation to target atypical bacterial infection.   LACTIC ACID, PLASMA - Normal   BLOOD CULTURE   BLOOD CULTURE   RAINBOW DRAW    Narrative:     The following orders were created for panel order [place] Draw.  Procedure                               Abnormality         Status                     ---------                               -----------         ------                     Green Top (Gel)[[id number]]                                  Final result               Lavender Top[[id number]]                                     Final result               Gold Top - SST[[id number]]                                   Final result               Light Blue Top[[id number]]                                   Final result                 Please view results for these tests on the individual orders.   CBC AND DIFFERENTIAL    Narrative:     The following orders were created for panel order CBC & Differential.  Procedure                               Abnormality         Status                     ---------                               -----------         ------                     CBC Auto Differential[[id number]]        Abnormal            Final result                 Please view results for these tests on the individual orders.    GREEN TOP   LAVENDER TOP   GOLD TOP - SST   LIGHT BLUE TOP       EKG:   ECG 12 Lead Dyspnea   Preliminary Result   HEART RATE=65  bpm   RR Msjrdgch=487  ms   ID Cworghml=313  ms   P Horizontal Axis=  deg   P Front Axis=53  deg   QRSD Qypqvpav=692  ms   QT Npohydzp=308  ms   ZRcE=463  ms   QRS Axis=-38  deg   T Wave Axis=21  deg   - ABNORMAL ECG -   Sinus rhythm   Probable left atrial enlargement   Nonspecific IVCD with LAD   Date and Time of Study:2025-03-13 21:58:19          Meds given in ED:   Medications   sodium chloride 0.9 % flush 10 mL (has no administration in time range)   albuterol (PROVENTIL) nebulizer solution 0.083% 2.5 mg/3mL (2.5 mg Nebulization Given 3/13/25 2318)   ipratropium-albuterol (DUO-NEB) nebulizer solution 3 mL (3 mL Nebulization Given 3/13/25 2312)   cefTRIAXone (ROCEPHIN) 1,000 mg in sodium chloride 0.9 % 100 mL MBP (0 mg Intravenous Stopped 3/13/25 2327)   methylPREDNISolone sodium succinate (SOLU-Medrol) injection 125 mg (125 mg Intravenous Given 3/13/25 2252)   lactated ringers bolus 500 mL (500 mL Intravenous New Bag 3/14/25 0035)   iopamidol (ISOVUE-370) 76 % injection 100 mL (95 mL Intravenous Given by Other 3/14/25 0049)       Imaging results:  XR Chest 1 View  Result Date: 3/13/2025  Left basilar consolidation may reflect pneumonia. A left pleural effusion is also suspected.  This report was finalized on 3/13/2025 10:38 PM by Dr. Mayte Perales M.D on Workstation: [id number]            Social issues:   Social History     Socioeconomic History    Marital status:        Peripheral Neurovascular  Peripheral Neurovascular (Adult)  Peripheral Neurovascular WDL: WDL    Neuro Cognitive  Neuro Cognitive (Adult)  Cognitive/Neuro/Behavioral WDL: .WDL except, speech  Speech: other (see comments) (struggles to find words)  Additional Documentation: NIH Stroke Scale (group)  NIH Stroke Scale  Interval: baseline  1a. Level of Consciousness: 0-->Alert, keenly responsive  1b. LOC  Questions: 0-->Answers both questions correctly  1c. LOC Commands: 0-->Performs both tasks correctly  2. Best Gaze: 0-->Normal  3. Visual: 0-->No visual loss  4. Facial Palsy: 0-->Normal symmetrical movements  5a. Motor Arm, Left: 0-->No drift, limb holds 90 (or 45) degrees for full 10 secs  5b. Motor Arm, Right: 0-->No drift, limb holds 90 (or 45) degrees for full 10 secs  6a. Motor Leg, Left: 0-->No drift, leg holds 30 degree position for full 5 secs  6b. Motor Leg, Right: 0-->No drift, leg holds 30 degree position for full 5 secs  7. Limb Ataxia: 0-->Absent  8. Sensory: 0-->Normal, no sensory loss  9. Best Language: 1-->Mild-to-moderate aphasia, some obvious loss of fluency or facility of comprehension, without significant limitation on ideas expressed or form of expression. Reduction of speech and/or comprehension, however, makes conversation. . . (see row details)  10. Dysarthria: 0-->Normal  11. Extinction and Inattention (formerly Neglect): 0-->No abnormality  Total (NIH Stroke Scale): 1    Learning  Learning Assessment  Learning Readiness and Ability: no barriers identified    Respiratory  Respiratory WDL  Respiratory WDL: .WDL except, cough, rhythm/pattern  Rhythm/Pattern, Respiratory: shortness of breath  Cough Frequency: infrequent  Cough Type: productive    Abdominal Pain       Pain Assessments  Pain (Adult)  (0-10) Pain Rating: Rest: 3  (0-10) Pain Rating: Activity: 3  Pain Location: chest  Pain Side/Orientation: left    NIH Stroke Scale  NIH Stroke Scale  Interval: baseline  1a. Level of Consciousness: 0-->Alert, keenly responsive  1b. LOC Questions: 0-->Answers both questions correctly  1c. LOC Commands: 0-->Performs both tasks correctly  2. Best Gaze: 0-->Normal  3. Visual: 0-->No visual loss  4. Facial Palsy: 0-->Normal symmetrical movements  5a. Motor Arm, Left: 0-->No drift, limb holds 90 (or 45) degrees for full 10 secs  5b. Motor Arm, Right: 0-->No drift, limb holds 90 (or 45) degrees for full  10 secs  6a. Motor Leg, Left: 0-->No drift, leg holds 30 degree position for full 5 secs  6b. Motor Leg, Right: 0-->No drift, leg holds 30 degree position for full 5 secs  7. Limb Ataxia: 0-->Absent  8. Sensory: 0-->Normal, no sensory loss  9. Best Language: 1-->Mild-to-moderate aphasia, some obvious loss of fluency or facility of comprehension, without significant limitation on ideas expressed or form of expression. Reduction of speech and/or comprehension, however, makes conversation. . . (see row details)  10. Dysarthria: 0-->Normal  11. Extinction and Inattention (formerly Neglect): 0-->No abnormality  Total (NIH Stroke Scale): 1    Hailey Chau RN  03/14/25 02:47 EDT

## 2025-03-14 NOTE — PLAN OF CARE
Goal Outcome Evaluation:   Pt arrived to unit around 0400. No noted change in NIH- NIH of 1 due to some trouble word finding. Vitals WDL.

## 2025-03-15 ENCOUNTER — APPOINTMENT (OUTPATIENT)
Dept: MRI IMAGING | Facility: HOSPITAL | Age: 80
DRG: 194 | End: 2025-03-15
Payer: COMMERCIAL

## 2025-03-15 LAB
ANION GAP SERPL CALCULATED.3IONS-SCNC: 14.7 MMOL/L (ref 5–15)
BUN SERPL-MCNC: 37 MG/DL (ref 8–23)
BUN/CREAT SERPL: 36.3 (ref 7–25)
CALCIUM SPEC-SCNC: 8.6 MG/DL (ref 8.6–10.5)
CHLORIDE SERPL-SCNC: 98 MMOL/L (ref 98–107)
CO2 SERPL-SCNC: 22.3 MMOL/L (ref 22–29)
CREAT SERPL-MCNC: 1.02 MG/DL (ref 0.57–1)
DEPRECATED RDW RBC AUTO: 48.2 FL (ref 37–54)
EGFRCR SERPLBLD CKD-EPI 2021: 56.8 ML/MIN/1.73
ERYTHROCYTE [DISTWIDTH] IN BLOOD BY AUTOMATED COUNT: 14.1 % (ref 12.3–15.4)
GLUCOSE SERPL-MCNC: 171 MG/DL (ref 65–99)
HCT VFR BLD AUTO: 35.3 % (ref 34–46.6)
HGB BLD-MCNC: 11.4 G/DL (ref 12–15.9)
MCH RBC QN AUTO: 29.8 PG (ref 26.6–33)
MCHC RBC AUTO-ENTMCNC: 32.3 G/DL (ref 31.5–35.7)
MCV RBC AUTO: 92.2 FL (ref 79–97)
PLATELET # BLD AUTO: 206 10*3/MM3 (ref 140–450)
PMV BLD AUTO: 10.7 FL (ref 6–12)
POTASSIUM SERPL-SCNC: 3.4 MMOL/L (ref 3.5–5.2)
RBC # BLD AUTO: 3.83 10*6/MM3 (ref 3.77–5.28)
SODIUM SERPL-SCNC: 135 MMOL/L (ref 136–145)
WBC NRBC COR # BLD AUTO: 17.2 10*3/MM3 (ref 3.4–10.8)

## 2025-03-15 PROCEDURE — 94664 DEMO&/EVAL PT USE INHALER: CPT

## 2025-03-15 PROCEDURE — 94799 UNLISTED PULMONARY SVC/PX: CPT

## 2025-03-15 PROCEDURE — 80048 BASIC METABOLIC PNL TOTAL CA: CPT | Performed by: STUDENT IN AN ORGANIZED HEALTH CARE EDUCATION/TRAINING PROGRAM

## 2025-03-15 PROCEDURE — 70551 MRI BRAIN STEM W/O DYE: CPT

## 2025-03-15 PROCEDURE — 85027 COMPLETE CBC AUTOMATED: CPT | Performed by: STUDENT IN AN ORGANIZED HEALTH CARE EDUCATION/TRAINING PROGRAM

## 2025-03-15 PROCEDURE — 25010000002 CEFTRIAXONE PER 250 MG: Performed by: STUDENT IN AN ORGANIZED HEALTH CARE EDUCATION/TRAINING PROGRAM

## 2025-03-15 PROCEDURE — 94761 N-INVAS EAR/PLS OXIMETRY MLT: CPT

## 2025-03-15 PROCEDURE — 94760 N-INVAS EAR/PLS OXIMETRY 1: CPT

## 2025-03-15 RX ORDER — LORAZEPAM 1 MG/1
1 TABLET ORAL ONCE AS NEEDED
Status: COMPLETED | OUTPATIENT
Start: 2025-03-15 | End: 2025-03-15

## 2025-03-15 RX ORDER — FUROSEMIDE 40 MG/1
40 TABLET ORAL DAILY
Status: DISCONTINUED | OUTPATIENT
Start: 2025-03-15 | End: 2025-03-16 | Stop reason: HOSPADM

## 2025-03-15 RX ORDER — FUROSEMIDE 40 MG/1
40 TABLET ORAL
Status: DISCONTINUED | OUTPATIENT
Start: 2025-03-15 | End: 2025-03-15

## 2025-03-15 RX ADMIN — ATORVASTATIN CALCIUM 40 MG: 20 TABLET, FILM COATED ORAL at 10:25

## 2025-03-15 RX ADMIN — IPRATROPIUM BROMIDE AND ALBUTEROL SULFATE 3 ML: .5; 3 SOLUTION RESPIRATORY (INHALATION) at 19:52

## 2025-03-15 RX ADMIN — CARVEDILOL 3.12 MG: 6.25 TABLET, FILM COATED ORAL at 10:25

## 2025-03-15 RX ADMIN — IPRATROPIUM BROMIDE AND ALBUTEROL SULFATE 3 ML: .5; 3 SOLUTION RESPIRATORY (INHALATION) at 07:36

## 2025-03-15 RX ADMIN — LORAZEPAM 1 MG: 1 TABLET ORAL at 13:25

## 2025-03-15 RX ADMIN — ASPIRIN 81 MG: 81 TABLET, COATED ORAL at 10:25

## 2025-03-15 RX ADMIN — Medication 10 ML: at 10:26

## 2025-03-15 RX ADMIN — IPRATROPIUM BROMIDE AND ALBUTEROL SULFATE 3 ML: .5; 3 SOLUTION RESPIRATORY (INHALATION) at 11:39

## 2025-03-15 RX ADMIN — LORAZEPAM 1 MG: 1 TABLET ORAL at 21:36

## 2025-03-15 RX ADMIN — LEVOTHYROXINE SODIUM 125 MCG: 125 TABLET ORAL at 06:07

## 2025-03-15 RX ADMIN — CEFTRIAXONE 2000 MG: 2 INJECTION, POWDER, FOR SOLUTION INTRAMUSCULAR; INTRAVENOUS at 10:26

## 2025-03-15 RX ADMIN — ISOSORBIDE MONONITRATE 30 MG: 30 TABLET, EXTENDED RELEASE ORAL at 10:25

## 2025-03-15 RX ADMIN — CLOPIDOGREL BISULFATE 75 MG: 75 TABLET ORAL at 10:25

## 2025-03-15 RX ADMIN — CARVEDILOL 3.12 MG: 6.25 TABLET, FILM COATED ORAL at 19:28

## 2025-03-15 RX ADMIN — LOSARTAN POTASSIUM 50 MG: 50 TABLET, FILM COATED ORAL at 10:25

## 2025-03-15 RX ADMIN — Medication 10 ML: at 20:05

## 2025-03-15 RX ADMIN — FUROSEMIDE 40 MG: 40 TABLET ORAL at 19:29

## 2025-03-15 NOTE — PROGRESS NOTES
Name: Joie Raymundo ADMIT: 3/13/2025   : 1945  PCP: Provider, No Known    MRN: [id number] LOS: 1 days   AGE/SEX: 79 y.o. female  ROOM: [facility]     Subjective   Examined at bedside. Reports feeling better. Eager for discharge.     Objective   Objective   Vital Signs  Temp:  [97.5 °F (36.4 °C)-98.8 °F (37.1 °C)] 98.6 °F (37 °C)  Heart Rate:  [48-65] 48  Resp:  [16-20] 16  BP: (109-158)/(56-76) 158/76  SpO2:  [94 %-99 %] 99 %  on  Flow (L/min) (Oxygen Therapy):  [3] 3;   Device (Oxygen Therapy): nasal cannula  Body mass index is 29.71 kg/m².  Physical Exam  Constitutional:       General: She is not in acute distress.  Eyes:      Extraocular Movements: Extraocular movements intact.      Pupils: Pupils are equal, round, and reactive to light.   Cardiovascular:      Rate and Rhythm: Normal rate and regular rhythm.   Pulmonary:      Effort: Pulmonary effort is normal. No respiratory distress.   Abdominal:      General: There is no distension.      Palpations: Abdomen is soft.      Tenderness: There is no abdominal tenderness.   Neurological:      General: No focal deficit present.      Mental Status: She is alert and oriented to person, place, and time.         Results Review     I reviewed the patient's new clinical results.  Results from last 7 days   Lab Units 03/15/25  0946 25  0549 25  2156   WBC 10*3/mm3 17.20* 15.56* 17.87*   HEMOGLOBIN g/dL 11.4* 10.5* 11.6*   PLATELETS 10*3/mm3 206 166 188     Results from last 7 days   Lab Units 03/15/25  0946 25  0549 25  2156   SODIUM mmol/L 135* 137 133*   POTASSIUM mmol/L 3.4* 3.2* 3.2*   CHLORIDE mmol/L 98 100 95*   CO2 mmol/L 22.3 22.8 26.5   BUN mg/dL 37* 30* 34*   CREATININE mg/dL 1.02* 1.13* 1.48*   GLUCOSE mg/dL 171* 189* 137*   Estimated Creatinine Clearance: 40 mL/min (A) (by C-G formula based on SCr of 1.02 mg/dL (H)).  Results from last 7 days   Lab Units 25  2156   ALBUMIN g/dL 3.8   BILIRUBIN mg/dL 1.6*   ALK PHOS U/L  91   AST (SGOT) U/L 18   ALT (SGPT) U/L 12     Results from last 7 days   Lab Units 03/15/25  0946 03/14/25  0549 03/13/25  2156   CALCIUM mg/dL 8.6 8.4* 8.9   ALBUMIN g/dL  --   --  3.8     Results from last 7 days   Lab Units 03/13/25  2156   PROCALCITONIN ng/mL 3.49*   LACTATE mmol/L 1.6     COVID19   Date Value Ref Range Status   03/13/2025 Not Detected Not Detected - Ref. Range Final     Glucose   Date/Time Value Ref Range Status   03/13/2025 2147 138 (H) 70 - 130 mg/dL Final     Results for orders placed or performed during the hospital encounter of 03/13/25   Blood Culture - Blood, Arm, Right    Specimen: Arm, Right; Blood   Result Value Ref Range    Blood Culture No growth at 24 hours          CT Angiogram Neck  Narrative: Patient: MIKE VILLASENOR  Time Out: 02:57  Exam(s): CTA NECK     EXAM:    CT Angiography Neck With Intravenous Contrast    CLINICAL HISTORY:     Reason for exam: word finding difficulty.    TECHNIQUE:    Routine carotid CT angiography protocol was performed with intravenous   contrast.  NASCET criteria using the distal ICAs for comparison were used   for evaluation of stenoses.  CTDI is 42.71 mGy and DLP is 1480.1 mGy-cm.    This CT exam was performed according to the principle of ALARA (As Low As   Reasonably Achievable) by using one or more of the following dose   reduction techniques: automated exposure control, adjustment of the mA   and or kV according to patient size, and or use of iterative   reconstruction technique.    MIP reconstructed images were created and reviewed.    COMPARISON:    None.    FINDINGS:     VASCULATURE:    Right common carotid artery:  Unremarkable.  No occlusion or   significant stenosis.  No dissection.    Right internal carotid artery:  Unremarkable.  Extracranial segment is   patent with no occlusion or significant stenosis.  No dissection.    Right external carotid artery:  Unremarkable.  No occlusion.    Right vertebral artery:  Unremarkable.  No  occlusion or significant   stenosis.  No dissection.      Left common carotid artery:  Unremarkable.  No occlusion or significant   stenosis.  No dissection.    Left internal carotid artery:  Unremarkable.  Extracranial segment is   patent with no occlusion or significant stenosis.  No dissection.    Left external carotid artery:  Unremarkable.  No occlusion.    Left vertebral artery:  Unremarkable.  No occlusion or significant   stenosis.  No dissection.     NECK:    Bones joints:  Unremarkable.  No acute fracture.    Soft tissues: Enlarged mediastinal lymph nodes.  Prominent lingual   tonsils.    Lung apices: Bronchitis with left upper lobe infiltrate.  Small left   pleural effusion.     CAROTID STENOSIS REFERENCE USING NASCET CRITERIA:    % ICA stenosis = (1 - narrowest ICA diameter diameter of distal   cervical ICA) x 100.    Mild - <50% stenosis.    Moderate - 50-69% stenosis.    Severe - 70-94% stenosis.    Near occlusion - 95-99% stenosis.    Occluded - 100% stenosis.    IMPRESSION:         Negative CTA neck.  Impression: Electronically signed by Anaid Walters MD on 03-14-25 at 0257  CT Angiogram Head  Narrative: Patient: MIKE VILLASENOR  Time Out: 02:54  Exam(s): CTA HEAD     EXAM:    CT Angiography Head Without and With Intravenous Contrast    CLINICAL HISTORY:     Reason for exam: word finding difficulty.    TECHNIQUE:  AI analysis of LVO was utilized    Axial computed tomographic angiography images of the head without and   with intravenous contrast.  CTDI is 42.71 mGy and DLP is 1480.1 mGy-cm.    This CT exam was performed according to the principle of ALARA (As Low As   Reasonably Achievable) by using one or more of the following dose   reduction techniques: automated exposure control, adjustment of the mA   and or kV according to patient size, and or use of iterative   reconstruction technique.    3D and MIP reconstructed images were created and reviewed.    COMPARISON:    No relevant prior  studies available.    FINDINGS:     VASCULATURE: The dural venous sinuses are patent.    Right internal carotid artery:  No acute findings.  Intracranial   segment is patent with no significant stenosis.  No aneurysm.    Right anterior cerebral artery:  Unremarkable.  No occlusion or   significant stenosis.  No aneurysm.    Right middle cerebral artery:  Unremarkable.  No occlusion or   significant stenosis.  No aneurysm.    Right posterior cerebral artery:  Unremarkable.  No occlusion or   significant stenosis.  No aneurysm.    Right vertebral artery:  Unremarkable as visualized.      Left internal carotid artery:  No acute findings.  Intracranial segment   is patent with no significant stenosis.  No aneurysm.    Left anterior cerebral artery:  Unremarkable.  No occlusion or   significant stenosis.  No aneurysm.    Left middle cerebral artery:  Unremarkable.  No occlusion or   significant stenosis.  No aneurysm.    Left posterior cerebral artery:  Unremarkable.  No occlusion or   significant stenosis.  No aneurysm.    Left vertebral artery:  Unremarkable as visualized.      Basilar artery:  Unremarkable.  No occlusion or significant stenosis.    No aneurysm.     HEAD:    Brain:  No acute findings.  No hemorrhage.  No edema.  Normal   enhancement.    Ventricles:  Unremarkable.  No ventriculomegaly.    Bones joints:  No acute fracture.    Soft tissues:  Unremarkable.    Sinuses:  Unremarkable as visualized.  No acute sinusitis.    Mastoid air cells:  Unremarkable as visualized.  No mastoid effusion.    IMPRESSION:       Negative CT angiogram of the head.  Impression: Electronically signed by Anaid Walters MD on 03-14-25 at 0254    Scheduled Medications  aspirin, 81 mg, Oral, Daily  atorvastatin, 40 mg, Oral, Daily  carvedilol, 3.125 mg, Oral, BID With Meals  cefTRIAXone, 2,000 mg, Intravenous, Q24H  clopidogrel, 75 mg, Oral, Daily  furosemide, 40 mg, Oral, BID Diuretics  ipratropium-albuterol, 3 mL, Nebulization,  Q6H While Awake - RT  isosorbide mononitrate, 30 mg, Oral, Daily  levothyroxine, 125 mcg, Oral, Daily  losartan, 50 mg, Oral, Q24H  sodium chloride, 10 mL, Intravenous, Q12H    Infusions   Diet  Diet: Regular/House; Fluid Consistency: Thin (IDDSI 0)       Assessment/Plan     Active Hospital Problems    Diagnosis  POA    **Acute pneumonia [J18.9]  Yes    Hyperlipidemia [E78.5]  Yes    Diastolic dysfunction [I51.89]  Yes    COPD (chronic obstructive pulmonary disease) [J44.9]  Yes    Chronic respiratory failure with hypoxia [J96.11]  Yes    CAD (coronary artery disease), native coronary artery [I25.10]  Yes    S/P coronary artery stent placement [Z95.5]  Not Applicable    History of ST elevation myocardial infarction (STEMI) [I25.2]  Not Applicable    Hypothyroidism, adult [E03.9]  Yes    Essential hypertension [I10]  Yes      Resolved Hospital Problems   No resolved problems to display.       79 y.o. female admitted with Acute pneumonia.    Acute pneumonia  Continue Rocephin.  Blood cultures obtained- curently NGTD. Plan for 5 day course of Abx.     COPD  Not currently in exacerbation.  I do not appreciate any significant wheezing on exam. Continue duonebs.  No need for steroids at this time     Slurred speech  CT angiogram of the head and neck were obtained which were negative.  Neurology was consulted.  MRI of the brain has also been ordered and is pending      Acute kidney injury  Creatinine today 1.48 on presentation.  Improved with IVF     Coronary artery disease status post PCI  Hypertension  Hyperlipidemia  Currently on losartan, Imdur, Plavix, Coreg, atorvastatin, aspirin.  All these will be continued  She had echocardiogram in June 2024 that showed an ejection fraction of 67%.  She does take Lasix 40 mg daily at home which is currently held due to SHANNON on initial evaluation- this will be resumed today     Hypothyroidism  On levothyroxine         SCDs for DVT prophylaxis.  Full code.  Discussed with  patient.  Anticipate discharge home tomorrow.      Stephen Rodrigez MD  [facility]  03/15/25  14:45 EDT    Copied text on this note has been reviewed and updated as of 03/15/25

## 2025-03-15 NOTE — PLAN OF CARE
Goal Outcome Evaluation:  Plan of Care Reviewed With: patient        Progress: improving          Problem: Pain Acute  Goal: Optimal Pain Control and Function  Outcome: Progressing  Intervention: Prevent or Manage Pain  Recent Flowsheet Documentation  Taken 3/14/2025 2032 by Tonia Bueno RN  Medication Review/Management: medications reviewed     Problem: Adult Inpatient Plan of Care  Goal: Plan of Care Review  Outcome: Progressing  Flowsheets (Taken 3/15/2025 6168)  Progress: improving  Plan of Care Reviewed With: patient

## 2025-03-16 ENCOUNTER — READMISSION MANAGEMENT (OUTPATIENT)
Dept: CALL CENTER | Facility: HOSPITAL | Age: 80
End: 2025-03-16
Payer: COMMERCIAL

## 2025-03-16 VITALS
SYSTOLIC BLOOD PRESSURE: 138 MMHG | HEIGHT: 60 IN | TEMPERATURE: 97.5 F | DIASTOLIC BLOOD PRESSURE: 65 MMHG | BODY MASS INDEX: 29.86 KG/M2 | HEART RATE: 52 BPM | OXYGEN SATURATION: 91 % | RESPIRATION RATE: 16 BRPM | WEIGHT: 152.12 LBS

## 2025-03-16 PROBLEM — J18.9 PNEUMONIA, UNSPECIFIED ORGANISM: Status: ACTIVE | Noted: 2025-03-16

## 2025-03-16 LAB
ANION GAP SERPL CALCULATED.3IONS-SCNC: 11.9 MMOL/L (ref 5–15)
BUN SERPL-MCNC: 39 MG/DL (ref 8–23)
BUN/CREAT SERPL: 33.3 (ref 7–25)
CALCIUM SPEC-SCNC: 8.8 MG/DL (ref 8.6–10.5)
CHLORIDE SERPL-SCNC: 102 MMOL/L (ref 98–107)
CO2 SERPL-SCNC: 26.1 MMOL/L (ref 22–29)
CREAT SERPL-MCNC: 1.17 MG/DL (ref 0.57–1)
DEPRECATED RDW RBC AUTO: 46.9 FL (ref 37–54)
EGFRCR SERPLBLD CKD-EPI 2021: 48.2 ML/MIN/1.73
ERYTHROCYTE [DISTWIDTH] IN BLOOD BY AUTOMATED COUNT: 13.9 % (ref 12.3–15.4)
GLUCOSE BLDC GLUCOMTR-MCNC: 114 MG/DL (ref 70–130)
GLUCOSE SERPL-MCNC: 110 MG/DL (ref 65–99)
HCT VFR BLD AUTO: 36.5 % (ref 34–46.6)
HGB BLD-MCNC: 11.6 G/DL (ref 12–15.9)
MCH RBC QN AUTO: 29.4 PG (ref 26.6–33)
MCHC RBC AUTO-ENTMCNC: 31.8 G/DL (ref 31.5–35.7)
MCV RBC AUTO: 92.4 FL (ref 79–97)
PLATELET # BLD AUTO: 246 10*3/MM3 (ref 140–450)
PMV BLD AUTO: 10.6 FL (ref 6–12)
POTASSIUM SERPL-SCNC: 3.3 MMOL/L (ref 3.5–5.2)
RBC # BLD AUTO: 3.95 10*6/MM3 (ref 3.77–5.28)
SODIUM SERPL-SCNC: 140 MMOL/L (ref 136–145)
WBC NRBC COR # BLD AUTO: 10.55 10*3/MM3 (ref 3.4–10.8)

## 2025-03-16 PROCEDURE — 80048 BASIC METABOLIC PNL TOTAL CA: CPT | Performed by: STUDENT IN AN ORGANIZED HEALTH CARE EDUCATION/TRAINING PROGRAM

## 2025-03-16 PROCEDURE — 94762 N-INVAS EAR/PLS OXIMTRY CONT: CPT

## 2025-03-16 PROCEDURE — 94664 DEMO&/EVAL PT USE INHALER: CPT

## 2025-03-16 PROCEDURE — 99222 1ST HOSP IP/OBS MODERATE 55: CPT | Performed by: STUDENT IN AN ORGANIZED HEALTH CARE EDUCATION/TRAINING PROGRAM

## 2025-03-16 PROCEDURE — 94799 UNLISTED PULMONARY SVC/PX: CPT

## 2025-03-16 PROCEDURE — 85027 COMPLETE CBC AUTOMATED: CPT | Performed by: STUDENT IN AN ORGANIZED HEALTH CARE EDUCATION/TRAINING PROGRAM

## 2025-03-16 PROCEDURE — 82948 REAGENT STRIP/BLOOD GLUCOSE: CPT

## 2025-03-16 PROCEDURE — 94761 N-INVAS EAR/PLS OXIMETRY MLT: CPT

## 2025-03-16 RX ORDER — POTASSIUM CHLORIDE 1500 MG/1
40 TABLET, EXTENDED RELEASE ORAL 2 TIMES DAILY
Qty: 4 TABLET | Refills: 0 | Status: SHIPPED | OUTPATIENT
Start: 2025-03-16 | End: 2025-03-17

## 2025-03-16 RX ADMIN — IPRATROPIUM BROMIDE AND ALBUTEROL SULFATE 3 ML: .5; 3 SOLUTION RESPIRATORY (INHALATION) at 11:18

## 2025-03-16 RX ADMIN — ASPIRIN 81 MG: 81 TABLET, COATED ORAL at 09:16

## 2025-03-16 RX ADMIN — Medication 10 ML: at 09:25

## 2025-03-16 RX ADMIN — FUROSEMIDE 40 MG: 40 TABLET ORAL at 09:16

## 2025-03-16 RX ADMIN — IPRATROPIUM BROMIDE AND ALBUTEROL SULFATE 3 ML: .5; 3 SOLUTION RESPIRATORY (INHALATION) at 07:55

## 2025-03-16 RX ADMIN — CLOPIDOGREL BISULFATE 75 MG: 75 TABLET ORAL at 09:16

## 2025-03-16 RX ADMIN — CARVEDILOL 3.12 MG: 6.25 TABLET, FILM COATED ORAL at 09:16

## 2025-03-16 RX ADMIN — LEVOTHYROXINE SODIUM 125 MCG: 125 TABLET ORAL at 05:46

## 2025-03-16 RX ADMIN — LOSARTAN POTASSIUM 50 MG: 50 TABLET, FILM COATED ORAL at 09:24

## 2025-03-16 RX ADMIN — ATORVASTATIN CALCIUM 40 MG: 20 TABLET, FILM COATED ORAL at 09:16

## 2025-03-16 RX ADMIN — ISOSORBIDE MONONITRATE 30 MG: 30 TABLET, EXTENDED RELEASE ORAL at 09:16

## 2025-03-16 NOTE — CONSULTS
"Neurology Consult Note    Consult Date: 3/16/2025    Referring MD: No ref. provider found    Reason for Consult I have been asked to see the patient in neurological consultation to render advice and opinion regarding transient aphasia    Joie Raymundo is a 79 y.o. female past medical history of COPD, congestive heart failure, hypertension presented to the ED with acute onset facial droop and slurred speech noticed by neighbors on 3/14/2025, by the time EMS arrived she only had intermittent word finding difficulties.  Due to unsure last known normal TNK was not given she had team D imaging while in the ER including CT head CTA head and neck and CT perfusion which were largely unremarkable for acute pathology.  She was later diagnosed with acute pneumonia by the hospitalist team to started on Rocephin.    Patient denies any new weakness numbness speech or vision problems she states she is completely back to normal apart from slight dizziness.        Past Medical History:   Diagnosis Date    COPD (chronic obstructive pulmonary disease)     Disease of thyroid gland     Elevated cholesterol     Hypertension        Exam  /72 (BP Location: Left arm, Patient Position: Lying)   Pulse (!) 42   Temp 97.5 °F (36.4 °C) (Oral)   Resp 16   Ht 152.4 cm (60\")   Wt 69 kg (152 lb 1.9 oz)   SpO2 91%   BMI 29.71 kg/m²   Gen: NAD, vitals reviewed  MS: oriented x3, comprehension repetition and fluency  CN: visual acuity grossly normal, PERRL, EOMI, no facial droop, no dysarthria  Motor: 5/5 throughout upper and lower extremities, normal tone    DATA:    Lab Results   Component Value Date    GLUCOSE 110 (H) 03/16/2025    CALCIUM 8.8 03/16/2025     03/16/2025    K 3.3 (L) 03/16/2025    CO2 26.1 03/16/2025     03/16/2025    BUN 39 (H) 03/16/2025    CREATININE 1.17 (H) 03/16/2025    EGFRIFAFRI >60 12/14/2022    BCR 33.3 (H) 03/16/2025    ANIONGAP 11.9 03/16/2025     Lab Results   Component Value Date    WBC 10.55 " 03/16/2025    HGB 11.6 (L) 03/16/2025    HCT 36.5 03/16/2025    MCV 92.4 03/16/2025     03/16/2025   Vitals  Afebrile  Heart rate 40s to 50s  Respiratory rate 16-18  Blood pressure 1 10-1 40      Lab review:   Sodium 140  Creatinine 1.17  Blood glucose 110  Normal LFTs    WBC 10  Hemoglobin 11.6 and platelets 246    Imaging review:   CT head without no acute hypodensity or hyperdensity    CT angio head and neck no acute large vessel occlusion stenosis or significant atherosclerosis    MRI brain no acute diffusion restriction mild to moderate small vessel disease 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    CTA head and neck MRI brain did not reveal any acute infarcts large vessel occlusions    Plan  Patient will follow-up in the stroke/TIA clinic  Patient will continue aspirin 81 and Plavix 75 mg at home  Continue home dose statin medication    Patient was complaining of some dizziness when she got up to walk this morning will check a bedside orthostatics if orthostatics are negative I think patient can be sent home with some reassurance that this will get better.  Labs any new neurological signs or symptoms she needs to come back to the ER for further evaluation      MDM   Reviewed: Previous charts, nursing notes and vitals   Reviewed: Previous labs and CT/CTA/MRI scan    Interpretation: Labs and CT/CTA/MRI scan   Total time providing care is :30-74 minutes. This excluded time spent performing separately reportable procedures and services  Consults :Neurology/Stroke    Please note that portions of this note were completed with a voice recognition program.     Jeison  Lillie Gutiérrez MD  Neuro Hospitalist /Vascular Neurology.

## 2025-03-16 NOTE — NURSING NOTE
Pt was expecting to D/C, leads and o2 finger probe were removed. D/C was cancelled and waiting for a neuro Dr to evaluate. Pt states she is leaving either way so leads were not put back on per pt.

## 2025-03-16 NOTE — CASE MANAGEMENT/SOCIAL WORK
Case Management Discharge Note      Final Note: dc home, refused rollator walker         Selected Continued Care - Discharged on 3/16/2025 Admission date: 3/13/2025 - Discharge disposition: Home or Self Care      Destination    No services have been selected for the patient.                Durable Medical Equipment Coordination complete.      Service Provider Services Address Phone Fax Patient Preferred    [facility] [id number] [address] [phone] --              Dialysis/Infusion    No services have been selected for the patient.                Home Medical Care    No services have been selected for the patient.                Therapy    No services have been selected for the patient.                Community Resources    No services have been selected for the patient.                Community & DME    No services have been selected for the patient.                    Transportation Services  Private: Car    Final Discharge Disposition Code: 01 - home or self-care

## 2025-03-16 NOTE — PLAN OF CARE
Goal Outcome Evaluation:  Plan of Care Reviewed With: patient        Progress: improving       Problem: Pain Acute  Goal: Optimal Pain Control and Function  Outcome: Progressing  Intervention: Optimize Psychosocial Wellbeing  Recent Flowsheet Documentation  Taken 3/15/2025 2005 by Tonia Bueno RN  Diversional Activities:   smartphone   television  Intervention: Prevent or Manage Pain  Recent Flowsheet Documentation  Taken 3/16/2025 0546 by Tonia Bueno RN  Medication Review/Management: medications reviewed  Taken 3/15/2025 2005 by Tonia Bueno RN  Medication Review/Management: medications reviewed     Problem: Adult Inpatient Plan of Care  Goal: Plan of Care Review  Outcome: Progressing  Flowsheets (Taken 3/16/2025 0602)  Progress: improving  Plan of Care Reviewed With: patient     Problem: Adult Inpatient Plan of Care  Goal: Plan of Care Review  Recent Flowsheet Documentation  Taken 3/16/2025 0602 by Tonia Bueno RN  Progress: improving  Plan of Care Reviewed With: patient  Goal: Absence of Hospital-Acquired Illness or Injury  Intervention: Identify and Manage Fall Risk  Recent Flowsheet Documentation  Taken 3/16/2025 0546 by Tonia Bueno RN  Safety Promotion/Fall Prevention:   safety round/check completed   fall prevention program maintained  Taken 3/16/2025 0420 by Tonia Bueno RN  Safety Promotion/Fall Prevention:   activity supervised   assistive device/personal items within reach   clutter free environment maintained   fall prevention program maintained   lighting adjusted   nonskid shoes/slippers when out of bed   safety round/check completed  Taken 3/16/2025 0313 by Tonia Bueno RN  Safety Promotion/Fall Prevention:   safety round/check completed   fall prevention program maintained  Taken 3/16/2025 0231 by Tonia Bueno RN  Safety Promotion/Fall Prevention:   safety round/check completed   fall prevention program maintained  Taken 3/16/2025 0136 by Tonia Bueno  RN  Safety Promotion/Fall Prevention:   safety round/check completed   fall prevention program maintained  Taken 3/16/2025 0033 by Tonia Bueno RN  Safety Promotion/Fall Prevention:   activity supervised   assistive device/personal items within reach   clutter free environment maintained   fall prevention program maintained   lighting adjusted   nonskid shoes/slippers when out of bed   safety round/check completed  Taken 3/15/2025 2316 by Tonia Bueno RN  Safety Promotion/Fall Prevention:   safety round/check completed   fall prevention program maintained  Taken 3/15/2025 2248 by Tonia Bueno RN  Safety Promotion/Fall Prevention:   safety round/check completed   fall prevention program maintained  Taken 3/15/2025 2126 by Tonia Bueno RN  Safety Promotion/Fall Prevention:   safety round/check completed   fall prevention program maintained  Taken 3/15/2025 2005 by Tonia Bueno RN  Safety Promotion/Fall Prevention:   activity supervised   assistive device/personal items within reach   clutter free environment maintained   fall prevention program maintained   lighting adjusted   nonskid shoes/slippers when out of bed   safety round/check completed  Intervention: Prevent Skin Injury  Recent Flowsheet Documentation  Taken 3/16/2025 0546 by Tonia Bueno RN  Body Position: position changed independently  Taken 3/16/2025 0420 by Tonia Bueno RN  Body Position: position changed independently  Taken 3/16/2025 0313 by Tonia Bueno RN  Body Position: position changed independently  Taken 3/16/2025 0231 by Tonia Bueno RN  Body Position: position changed independently  Taken 3/16/2025 0136 by Tonia Bueno RN  Body Position: position changed independently  Taken 3/16/2025 0033 by Tonia Bueno RN  Body Position: position changed independently  Taken 3/15/2025 2316 by Tonia Bueno RN  Body Position: position changed independently  Taken 3/15/2025 2248 by Tonia Bueno  RN  Body Position: position changed independently  Taken 3/15/2025 2126 by Tonia Bueno RN  Body Position: position changed independently  Taken 3/15/2025 2005 by Tonia Bueno RN  Body Position: position changed independently  Intervention: Prevent Infection  Recent Flowsheet Documentation  Taken 3/16/2025 0546 by Tonia Bueno RN  Infection Prevention:   single patient room provided   visitors restricted/screened  Taken 3/16/2025 0420 by Tonia Bueno RN  Infection Prevention:   single patient room provided   visitors restricted/screened  Taken 3/16/2025 0313 by Tonia Bueno RN  Infection Prevention:   single patient room provided   visitors restricted/screened  Taken 3/16/2025 0231 by Tonia Bueno RN  Infection Prevention:   single patient room provided   visitors restricted/screened  Taken 3/16/2025 0136 by Tonia Bueno RN  Infection Prevention:   single patient room provided   visitors restricted/screened  Taken 3/16/2025 0033 by Tonia Bueno RN  Infection Prevention:   single patient room provided   visitors restricted/screened  Taken 3/15/2025 2316 by Tonia Bueno RN  Infection Prevention:   single patient room provided   visitors restricted/screened  Taken 3/15/2025 2248 by Tonia Bueno RN  Infection Prevention:   single patient room provided   visitors restricted/screened  Taken 3/15/2025 2126 by Tonia Bueno RN  Infection Prevention:   single patient room provided   visitors restricted/screened  Taken 3/15/2025 2005 by Tonia Bueno RN  Infection Prevention:   single patient room provided   visitors restricted/screened  Goal: Optimal Comfort and Wellbeing  Intervention: Provide Person-Centered Care  Recent Flowsheet Documentation  Taken 3/16/2025 0420 by Tonia Bueno RN  Trust Relationship/Rapport:   care explained   choices provided   reassurance provided   thoughts/feelings acknowledged  Taken 3/16/2025 0033 by Tonia Bueno RN  Trust  Relationship/Rapport:   care explained   choices provided   reassurance provided   thoughts/feelings acknowledged  Taken 3/15/2025 2005 by Tonia Beuno RN  Trust Relationship/Rapport:   care explained   choices provided   reassurance provided   thoughts/feelings acknowledged     Problem: Adult Inpatient Plan of Care  Goal: Absence of Hospital-Acquired Illness or Injury  Intervention: Identify and Manage Fall Risk  Recent Flowsheet Documentation  Taken 3/16/2025 0546 by Tonia Bueno RN  Safety Promotion/Fall Prevention:   safety round/check completed   fall prevention program maintained  Taken 3/16/2025 0420 by Tonia Bueno RN  Safety Promotion/Fall Prevention:   activity supervised   assistive device/personal items within reach   clutter free environment maintained   fall prevention program maintained   lighting adjusted   nonskid shoes/slippers when out of bed   safety round/check completed  Taken 3/16/2025 0313 by Tonia Bueno RN  Safety Promotion/Fall Prevention:   safety round/check completed   fall prevention program maintained  Taken 3/16/2025 0231 by Tonia Bueno RN  Safety Promotion/Fall Prevention:   safety round/check completed   fall prevention program maintained  Taken 3/16/2025 0136 by Tonia Bueno RN  Safety Promotion/Fall Prevention:   safety round/check completed   fall prevention program maintained  Taken 3/16/2025 0033 by Tonia Bueno RN  Safety Promotion/Fall Prevention:   activity supervised   assistive device/personal items within reach   clutter free environment maintained   fall prevention program maintained   lighting adjusted   nonskid shoes/slippers when out of bed   safety round/check completed  Taken 3/15/2025 2316 by Tonia Bueno, RN  Safety Promotion/Fall Prevention:   safety round/check completed   fall prevention program maintained  Taken 3/15/2025 2248 by Tonia Bueno, RN  Safety Promotion/Fall Prevention:   safety round/check completed    fall prevention program maintained  Taken 3/15/2025 2126 by Tonia Bueno RN  Safety Promotion/Fall Prevention:   safety round/check completed   fall prevention program maintained  Taken 3/15/2025 2005 by Tonia Bueno RN  Safety Promotion/Fall Prevention:   activity supervised   assistive device/personal items within reach   clutter free environment maintained   fall prevention program maintained   lighting adjusted   nonskid shoes/slippers when out of bed   safety round/check completed  Intervention: Prevent Skin Injury  Recent Flowsheet Documentation  Taken 3/16/2025 0546 by Tonia Bueno RN  Body Position: position changed independently  Taken 3/16/2025 0420 by Tonia Bueno RN  Body Position: position changed independently  Taken 3/16/2025 0313 by Tonia Bueno RN  Body Position: position changed independently  Taken 3/16/2025 0231 by Tonia Bueno RN  Body Position: position changed independently  Taken 3/16/2025 0136 by Tonia Bueno RN  Body Position: position changed independently  Taken 3/16/2025 0033 by Tonia Bueno RN  Body Position: position changed independently  Taken 3/15/2025 2316 by Tonia Bueno RN  Body Position: position changed independently  Taken 3/15/2025 2248 by Tonia Bueno RN  Body Position: position changed independently  Taken 3/15/2025 2126 by Tonia Bueno RN  Body Position: position changed independently  Taken 3/15/2025 2005 by Tonia Bueno RN  Body Position: position changed independently  Intervention: Prevent Infection  Recent Flowsheet Documentation  Taken 3/16/2025 0546 by Tonia Bueno RN  Infection Prevention:   single patient room provided   visitors restricted/screened  Taken 3/16/2025 0420 by Tonia Bueno RN  Infection Prevention:   single patient room provided   visitors restricted/screened  Taken 3/16/2025 0313 by Tonia Bueno RN  Infection Prevention:   single patient room provided   visitors  restricted/screened  Taken 3/16/2025 0231 by Tonia Bueno RN  Infection Prevention:   single patient room provided   visitors restricted/screened  Taken 3/16/2025 0136 by Tonia Bueno RN  Infection Prevention:   single patient room provided   visitors restricted/screened  Taken 3/16/2025 0033 by Tonia Bueno RN  Infection Prevention:   single patient room provided   visitors restricted/screened  Taken 3/15/2025 2316 by Tonia Bueno RN  Infection Prevention:   single patient room provided   visitors restricted/screened  Taken 3/15/2025 2248 by Tonia Bueno RN  Infection Prevention:   single patient room provided   visitors restricted/screened  Taken 3/15/2025 2126 by Tonia Bueno RN  Infection Prevention:   single patient room provided   visitors restricted/screened  Taken 3/15/2025 2005 by Tonia Bueno RN  Infection Prevention:   single patient room provided   visitors restricted/screened  Goal: Optimal Comfort and Wellbeing  Intervention: Provide Person-Centered Care  Recent Flowsheet Documentation  Taken 3/16/2025 0420 by Tonia Bueno RN  Trust Relationship/Rapport:   care explained   choices provided   reassurance provided   thoughts/feelings acknowledged  Taken 3/16/2025 0033 by Tonia Bueno RN  Trust Relationship/Rapport:   care explained   choices provided   reassurance provided   thoughts/feelings acknowledged  Taken 3/15/2025 2005 by Tonia Bueno RN  Trust Relationship/Rapport:   care explained   choices provided   reassurance provided   thoughts/feelings acknowledged

## 2025-03-16 NOTE — DISCHARGE SUMMARY
Patient Name: Joie Raymundo  : 1945  MRN: [id number]    Date of Admission: 3/13/2025  Date of Discharge:  3/16/2025  Primary Care Physician: Provider, No Known      Chief Complaint:   Weakness - Generalized      Discharge Diagnoses     Active Hospital Problems    Diagnosis  POA    **Acute pneumonia [J18.9]  Yes    Pneumonia, unspecified organism [J18.9]  Yes    Hyperlipidemia [E78.5]  Yes    Diastolic dysfunction [I51.89]  Yes    COPD (chronic obstructive pulmonary disease) [J44.9]  Yes    Chronic respiratory failure with hypoxia [J96.11]  Yes    CAD (coronary artery disease), native coronary artery [I25.10]  Yes    S/P coronary artery stent placement [Z95.5]  Not Applicable    History of ST elevation myocardial infarction (STEMI) [I25.2]  Not Applicable    Hypothyroidism, adult [E03.9]  Yes    Essential hypertension [I10]  Yes      Resolved Hospital Problems   No resolved problems to display.        Hospital Course     This is a 79-year-old woman with a past medical history of COPD, congestive heart failure, hypertension presented to hospital with acute facial droop and slurred speech that was noted by her neighbors.  When EMS called to the scene EMS was called to the scene and did not note any neurological deficits or slurred speech however the patient did have some intermittent word finding difficulties.  She was noted to be hypoxic and febrile.  Upon presenting to the emergency department he underwent a CT angiogram of the head and neck which were negative for any acute findings.  This was followed by an MRI which showed previous CVAs with was negative for any acute infarcts.  She was seen in consultation by neurology and was instructed to maintain aspirin, Plavix, statin.  She will follow-up with neurology in clinic  Imaging also revealed a pneumonia and she was started on antibiotics.  She remained afebrile after initiation of antibiotics and was discharged on Augmentin to complete an  appropriate course of antibiotic therapy for pneumonia.      Physical Exam:  Temp:  [97.5 °F (36.4 °C)-97.7 °F (36.5 °C)] 97.5 °F (36.4 °C)  Heart Rate:  [40-55] 42  Resp:  [16-18] 16  BP: (104-141)/(53-79) 141/72  Body mass index is 29.71 kg/m².  Physical Exam  Constitutional:       Appearance: Normal appearance.   HENT:      Head: Normocephalic and atraumatic.   Eyes:      Extraocular Movements: Extraocular movements intact.      Pupils: Pupils are equal, round, and reactive to light.   Cardiovascular:      Rate and Rhythm: Normal rate and regular rhythm.   Pulmonary:      Effort: Pulmonary effort is normal. No respiratory distress.   Abdominal:      General: There is no distension.      Palpations: Abdomen is soft.      Tenderness: There is no abdominal tenderness.   Musculoskeletal:         General: No swelling or tenderness.   Skin:     General: Skin is warm and dry.   Neurological:      General: No focal deficit present.      Mental Status: She is alert and oriented to person, place, and time.         Consultants     Consult Orders (all) (From admission, onward)       Start     Ordered    03/14/25 0215  [facility] (on-call MD unless specified) Details  Once        Specialty:  Hospitalist  Provider:  (Not yet assigned)    03/14/25 0214                  Procedures     Imaging Results (All)       Procedure Component Value Units Date/Time    MRI Brain Without Contrast [[id number]] Collected: 03/15/25 2353     Updated: 03/16/25 0008    Narrative:      BRAIN MRI WITHOUT CONTRAST     HISTORY: slurred speech; J18.9-Pneumonia, unspecified organism;  J96.21-Acute and chronic respiratory failure with hypoxia; J44.1-Chronic  obstructive pulmonary disease with (acute) exacerbation; R47.89-Other  speech disturbances; N17.9-Acute kidney failure, unspecified     COMPARISON: March 14, 2025.     FINDINGS:  Multiplanar images of the head were obtained without  gadolinium. No areas of restricted diffusion are seen to suggest  acute  infarct. The ventricles are normal in size. There is some mild  periventricular and deep white matter microangiopathic change. There is  no midline shift or mass effect. The intracranial flow voids appear  intact. 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 No  abnormalities are seen on susceptibility weighted imaging. There is  probably some minimal mucosal thickening within the ethmoid sinuses.       Impression:      1. No acute intracranial abnormality.        This report was finalized on 3/16/2025 12:05 AM by Dr. Mayte Perales M.D on Workstation: [id number]       CT Angiogram Neck [[id number]] Collected: 03/14/25 0258     Updated: 03/14/25 0258    Narrative:        Patient: MKIE VILLASENOR  Time Out: 02:57  Exam(s): CTA NECK     EXAM:    CT Angiography Neck With Intravenous Contrast    CLINICAL HISTORY:     Reason for exam: word finding difficulty.    TECHNIQUE:    Routine carotid CT angiography protocol was performed with intravenous   contrast.  NASCET criteria using the distal ICAs for comparison were used   for evaluation of stenoses.  CTDI is 42.71 mGy and DLP is 1480.1 mGy-cm.    This CT exam was performed according to the principle of ALARA (As Low As   Reasonably Achievable) by using one or more of the following dose   reduction techniques: automated exposure control, adjustment of the mA   and or kV according to patient size, and or use of iterative   reconstruction technique.    MIP reconstructed images were created and reviewed.    COMPARISON:    None.    FINDINGS:     VASCULATURE:    Right common carotid artery:  Unremarkable.  No occlusion or   significant stenosis.  No dissection.    Right internal carotid artery:  Unremarkable.  Extracranial segment is   patent with no occlusion or significant stenosis.  No dissection.    Right external carotid artery:  Unremarkable.  No occlusion.    Right vertebral artery:  Unremarkable.  No occlusion or significant   stenosis.  No  dissection.      Left common carotid artery:  Unremarkable.  No occlusion or significant   stenosis.  No dissection.    Left internal carotid artery:  Unremarkable.  Extracranial segment is   patent with no occlusion or significant stenosis.  No dissection.    Left external carotid artery:  Unremarkable.  No occlusion.    Left vertebral artery:  Unremarkable.  No occlusion or significant   stenosis.  No dissection.     NECK:    Bones joints:  Unremarkable.  No acute fracture.    Soft tissues: Enlarged mediastinal lymph nodes.  Prominent lingual   tonsils.    Lung apices: Bronchitis with left upper lobe infiltrate.  Small left   pleural effusion.     CAROTID STENOSIS REFERENCE USING NASCET CRITERIA:    % ICA stenosis = (1 - narrowest ICA diameter diameter of distal   cervical ICA) x 100.    Mild - <50% stenosis.    Moderate - 50-69% stenosis.    Severe - 70-94% stenosis.    Near occlusion - 95-99% stenosis.    Occluded - 100% stenosis.    IMPRESSION:         Negative CTA neck.      Impression:          Electronically signed by Anaid Walters MD on 03-14-25 at 0257    CT Angiogram Head [[id number]] Collected: 03/14/25 0255     Updated: 03/14/25 0255    Narrative:        Patient: MIKE VILLASENOR  Time Out: 02:54  Exam(s): CTA HEAD     EXAM:    CT Angiography Head Without and With Intravenous Contrast    CLINICAL HISTORY:     Reason for exam: word finding difficulty.    TECHNIQUE:  AI analysis of LVO was utilized    Axial computed tomographic angiography images of the head without and   with intravenous contrast.  CTDI is 42.71 mGy and DLP is 1480.1 mGy-cm.    This CT exam was performed according to the principle of ALARA (As Low As   Reasonably Achievable) by using one or more of the following dose   reduction techniques: automated exposure control, adjustment of the mA   and or kV according to patient size, and or use of iterative   reconstruction technique.    3D and MIP reconstructed images were created and  reviewed.    COMPARISON:    No relevant prior studies available.    FINDINGS:     VASCULATURE: The dural venous sinuses are patent.    Right internal carotid artery:  No acute findings.  Intracranial   segment is patent with no significant stenosis.  No aneurysm.    Right anterior cerebral artery:  Unremarkable.  No occlusion or   significant stenosis.  No aneurysm.    Right middle cerebral artery:  Unremarkable.  No occlusion or   significant stenosis.  No aneurysm.    Right posterior cerebral artery:  Unremarkable.  No occlusion or   significant stenosis.  No aneurysm.    Right vertebral artery:  Unremarkable as visualized.      Left internal carotid artery:  No acute findings.  Intracranial segment   is patent with no significant stenosis.  No aneurysm.    Left anterior cerebral artery:  Unremarkable.  No occlusion or   significant stenosis.  No aneurysm.    Left middle cerebral artery:  Unremarkable.  No occlusion or   significant stenosis.  No aneurysm.    Left posterior cerebral artery:  Unremarkable.  No occlusion or   significant stenosis.  No aneurysm.    Left vertebral artery:  Unremarkable as visualized.      Basilar artery:  Unremarkable.  No occlusion or significant stenosis.    No aneurysm.     HEAD:    Brain:  No acute findings.  No hemorrhage.  No edema.  Normal   enhancement.    Ventricles:  Unremarkable.  No ventriculomegaly.    Bones joints:  No acute fracture.    Soft tissues:  Unremarkable.    Sinuses:  Unremarkable as visualized.  No acute sinusitis.    Mastoid air cells:  Unremarkable as visualized.  No mastoid effusion.    IMPRESSION:       Negative CT angiogram of the head.      Impression:          Electronically signed by Anaid Walters MD on 03-14-25 at 0254    XR Chest 1 View [[id number]] Collected: 03/13/25 2236     Updated: 03/13/25 2241    Narrative:      SINGLE VIEW OF THE CHEST     HISTORY: CHF/COPD     COMPARISON: None available.     FINDINGS:  There is cardiomegaly. There is no  "vascular congestion. There is some  interstitial prominence noted within the left lung, which may reflect  some chronic scarring. There is some further scarring suspected at the  right lung base. However, there is dense consolidation noted at the left  lung base, and probable left pleural effusion. Pneumonia is not  excluded.       Impression:      Left basilar consolidation may reflect pneumonia. A left pleural  effusion is also suspected.     This report was finalized on 3/13/2025 10:38 PM by Dr. Mayte Perales M.D on Workstation: [id number]               Pertinent Labs     Results from last 7 days   Lab Units 03/16/25  0634 03/15/25  0946 03/14/25 0549 03/13/25  2156   WBC 10*3/mm3 10.55 17.20* 15.56* 17.87*   HEMOGLOBIN g/dL 11.6* 11.4* 10.5* 11.6*   PLATELETS 10*3/mm3 246 206 166 188     Results from last 7 days   Lab Units 03/16/25  0634 03/15/25  0946 03/14/25 0549 03/13/25  2156   SODIUM mmol/L 140 135* 137 133*   POTASSIUM mmol/L 3.3* 3.4* 3.2* 3.2*   CHLORIDE mmol/L 102 98 100 95*   CO2 mmol/L 26.1 22.3 22.8 26.5   BUN mg/dL 39* 37* 30* 34*   CREATININE mg/dL 1.17* 1.02* 1.13* 1.48*   GLUCOSE mg/dL 110* 171* 189* 137*   Estimated Creatinine Clearance: 34.9 mL/min (A) (by C-G formula based on SCr of 1.17 mg/dL (H)).  Results from last 7 days   Lab Units 03/13/25  2156   ALBUMIN g/dL 3.8   BILIRUBIN mg/dL 1.6*   ALK PHOS U/L 91   AST (SGOT) U/L 18   ALT (SGPT) U/L 12     Results from last 7 days   Lab Units 03/16/25  0634 03/15/25  0946 03/14/25 0549 03/13/25  2156   CALCIUM mg/dL 8.8 8.6 8.4* 8.9   ALBUMIN g/dL  --   --   --  3.8       Results from last 7 days   Lab Units 03/14/25 0549 03/13/25  2304 03/13/25  2156   HSTROP T ng/L 32* 41* 43*   PROBNP pg/mL  --   --  3,470.0*           Invalid input(s): \"LDLCALC\"  Results from last 7 days   Lab Units 03/13/25 2218 03/13/25  2212   BLOODCX  No growth at 2 days No growth at 2 days       Test Results Pending at Discharge     Pending Labs       Order " Current Status    Blood Culture - Blood, Arm, Right Preliminary result    Blood Culture - Blood, Arm, Right Preliminary result            Discharge Details        Discharge Medications        New Medications        Instructions Start Date   amoxicillin-clavulanate 875-125 MG per tablet  Commonly known as: AUGMENTIN   1 tablet, Oral, 2 Times Daily      potassium chloride ER 20 MEQ tablet controlled-release ER tablet  Commonly known as: K-TAB   40 mEq, Oral, 2 Times Daily             Continue These Medications        Instructions Start Date   aspirin 81 MG EC tablet   81 mg, Daily      atorvastatin 40 MG tablet  Commonly known as: LIPITOR   40 mg, Oral, Daily      carvedilol 3.125 MG tablet  Commonly known as: COREG   3.125 mg, Oral, 2 Times Daily With Meals      clopidogrel 75 MG tablet  Commonly known as: PLAVIX   75 mg, Oral, Daily      furosemide 40 MG tablet  Commonly known as: LASIX   40 mg, Oral, 3 Times Daily      isosorbide mononitrate 30 MG 24 hr tablet  Commonly known as: IMDUR   30 mg, Daily      Multiple Vitamins-Minerals liquid liquid   Oral, Daily      olmesartan 20 MG tablet  Commonly known as: BENICAR   20 mg, Oral, Daily      Synthroid 125 MCG tablet  Generic drug: levothyroxine   125 mcg, Oral, Daily               No Known Allergies      Discharge Disposition:  Home or Self Care    Discharge Diet:  Diet Order   Procedures    Diet: Regular/House; Fluid Consistency: Thin (IDDSI 0)       Discharge Activity:       CODE STATUS:    Code Status and Medical Interventions: CPR (Attempt to Resuscitate); Full Support   Ordered at: 03/14/25 0248     Code Status (Patient has no pulse and is not breathing):    CPR (Attempt to Resuscitate)     Medical Interventions (Patient has pulse or is breathing):    Full Support       No future appointments.   Follow-up Information       Provider, No Known .    Contact information:  [facility] [address]                             Time Spent on Discharge:   Greater than 30 minutes      Stephen Rodrigez MD  [facility]  03/16/25  08:36 EDT

## 2025-03-16 NOTE — DISCHARGE PLACEMENT REQUEST
"Joie Villasenor (79 y.o. Female)       Date of Birth   1945    Social Security Number       Address   [address] [place] [address]    Home Phone   [phone]    MRN   [id number]       Cheondoism   None    Marital Status                               Admission Date   3/13/2025    Admission Type   Emergency    Admitting Provider   Stephen Rodrigez MD    Attending Provider   Stephen Rodrigez MD    Department, Room/Bed   [facility], N336/1       Discharge Date       Discharge Disposition   Home or Self Care    Discharge Destination                                 Attending Provider: Stephen Rodrigez MD    Allergies: No Known Allergies    Isolation: None   Infection: None   Code Status: CPR    Ht: 152.4 cm (60\")   Wt: 69 kg (152 lb 1.9 oz)    Admission Cmt: None   Principal Problem: Acute pneumonia [J18.9]                   Active Insurance as of 3/13/2025       Primary Coverage       Payor Plan Insurance Group Employer/Plan Group    MEDICARE MEDICARE A ONLY        Payor Plan Address Payor Plan Phone Number Payor Plan Fax Number Effective Dates    [address] [phone]  9/1/2012 - None Entered    [facility] [id number]         Subscriber Name Subscriber Birth Date Member ID       JOIE VILLASENOR 1945 [id number]               Secondary Coverage       Payor Plan Insurance Group Employer/Plan Group    [employer]  [employer]        Payor Plan Address Payor Plan Phone Number Payor Plan Fax Number Effective Dates    [address] [phone]  3/13/2025 - None Entered    [facility] [id number]         Subscriber Name Subscriber Birth Date Member ID       JOIE VILLASENOR 1945 [id number]                     Emergency Contacts        (Rel.) Home Phone Work Phone Mobile Phone    FELICIANO WALDROP (Friend) [phone] -- [phone]                "

## 2025-03-16 NOTE — CASE MANAGEMENT/SOCIAL WORK
Continued Stay Note  [facility]     Patient Name: Jioe Raymundo  MRN: [id number]  Today's Date: 3/16/2025    Admit Date: 3/13/2025    Plan: Plans to DC to home today. Refused order for rollator walker.   Discharge Plan       Row Name 03/16/25 0935       Plan    Plan Plans to DC to home today. Refused order for rollator walker.    Patient/Family in Agreement with Plan yes    Plan Comments Contacted by staff RN stating pt will need a walker for DC and had questions re: O2 at home. Message to MD to place DME order. Order placed. Staff RN stated pt said she uses [facility]. Chart reviewed. Order noted for rollator walker. Attempted to deliver a rollator from [facility] to pt's bedside. Pt stated she told the RN she did not want a walker ordered. She has a neighbor that has a rollator she can use, and she does not want the walker. Asked pt about any questions I could answer with regard to her O2 equipment. Pt states she does not have any equipment with her, she has issues with equipment at home. Encouraged pt to call [facility] on Monday to discuss her questions re: safe use of home O2/DME. Pt verbalized understanding. Updated staff RN........JW                   Discharge Codes    No documentation.                 Expected Discharge Date and Time       Expected Discharge Date Expected Discharge Time    Mar 16, 2025               Rosaura Myrick, RN

## 2025-03-17 NOTE — OUTREACH NOTE
Prep Survey      Flowsheet Row Responses   [facility]? [place]   Is LACE score < 7 ? No   Eligibility Readm Mgmt   Discharge diagnosis Acute pneumonia   Does the patient have one of the following disease processes/diagnoses(primary or secondary)? Pneumonia   Does the patient have Home health ordered? No   Is there a DME ordered? Yes   What DME was ordered? [facility]   Prep survey completed? Yes            MICHELLE WHITING - Registered Nurse

## 2025-03-18 LAB
BACTERIA SPEC AEROBE CULT: NORMAL
BACTERIA SPEC AEROBE CULT: NORMAL

## 2025-03-20 ENCOUNTER — READMISSION MANAGEMENT (OUTPATIENT)
Dept: CALL CENTER | Facility: HOSPITAL | Age: 80
End: 2025-03-20
Payer: COMMERCIAL

## 2025-03-20 NOTE — OUTREACH NOTE
COPD/PN Week 1 Survey      Flowsheet Row Responses   [facility] patient discharged from? [place]   Does the patient have one of the following disease processes/diagnoses(primary or secondary)? Pneumonia   Week 1 attempt successful? Yes   Call start time 1522   Call end time 1539   Discharge diagnosis Acute pneumonia   Meds reviewed with patient/caregiver? Yes   Is the patient having any side effects they believe may be caused by any medication additions or changes? No   Does the patient have all medications ordered at discharge? Yes   Is the patient taking all medications as directed (includes completed medication regime)? Yes   Does the patient have a primary care provider?  Yes   Does the patient have an appointment with their PCP or specialist within 7 days of discharge? Yes   Comments regarding PCP PCP Tor PEREZ   Has the patient kept scheduled appointments due by today? Yes   Has home health visited the patient within 72 hours of discharge? N/A   Has all DME been delivered? No  [pt declined]   DME comments wears 3LO2 at night   Pulse Ox monitoring None   Psychosocial issues? No   Did the patient receive a copy of their discharge instructions? Yes   Nursing interventions Reviewed instructions with patient   What is the patient's perception of their health status since discharge? Improving  [some chest discomfort]   Nursing Interventions Nurse provided patient education   Is the patient/caregiver able to teach back the hierarchy of who to call/visit for symptoms/problems? PCP, Specialist, Home health nurse, Urgent Care, ED, 911 Yes   Is the patient/caregiver able to teach back signs and symptoms of worsening condition: Fever/chills, Chest pain, Shortness of breath   Is the patient/caregiver able to teach back importance of completing antibiotic course of treatment? Yes   Week 1 call completed? Yes   Is the patient interested in additional calls from an ambulatory ? No   Would this  patient benefit from a Referral to [facility] Social Work? No   Call end time 153            Anai TELLES - Registered Nurse

## 2025-03-24 NOTE — PROGRESS NOTES
Enter Query Response Below      Query Response: Unable to determine              If applicable, please update the problem list.

## 2025-04-30 ENCOUNTER — OFFICE VISIT (OUTPATIENT)
Dept: NEUROLOGY | Facility: CLINIC | Age: 80
End: 2025-04-30
Payer: COMMERCIAL

## 2025-04-30 VITALS
HEIGHT: 60 IN | DIASTOLIC BLOOD PRESSURE: 72 MMHG | HEART RATE: 52 BPM | BODY MASS INDEX: 32.2 KG/M2 | WEIGHT: 164 LBS | SYSTOLIC BLOOD PRESSURE: 122 MMHG | OXYGEN SATURATION: 94 %

## 2025-04-30 DIAGNOSIS — R73.03 PREDIABETES: ICD-10-CM

## 2025-04-30 DIAGNOSIS — Z72.0 TOBACCO ABUSE: ICD-10-CM

## 2025-04-30 DIAGNOSIS — Z86.73 HISTORY OF STROKE: ICD-10-CM

## 2025-04-30 DIAGNOSIS — I10 ESSENTIAL HYPERTENSION: ICD-10-CM

## 2025-04-30 DIAGNOSIS — Z09 HOSPITAL DISCHARGE FOLLOW-UP: ICD-10-CM

## 2025-04-30 DIAGNOSIS — I67.9 CEREBROVASCULAR DISEASE: Primary | ICD-10-CM

## 2025-04-30 DIAGNOSIS — E78.2 MIXED HYPERLIPIDEMIA: ICD-10-CM

## 2025-04-30 PROBLEM — I51.7 LVH (LEFT VENTRICULAR HYPERTROPHY): Status: ACTIVE | Noted: 2024-09-09

## 2025-04-30 PROBLEM — R06.00 DYSPNEA: Status: ACTIVE | Noted: 2022-08-10

## 2025-04-30 PROBLEM — Z86.0100 HISTORY OF COLON POLYPS: Status: ACTIVE | Noted: 2020-09-17

## 2025-04-30 RX ORDER — POTASSIUM CHLORIDE 1500 MG/1
20 TABLET, EXTENDED RELEASE ORAL DAILY
COMMUNITY
Start: 2025-03-19

## 2025-04-30 RX ORDER — FEXOFENADINE HCL 180 MG/1
180 TABLET ORAL DAILY
COMMUNITY
Start: 2025-01-13 | End: 2025-07-12

## 2025-04-30 NOTE — PATIENT INSTRUCTIONS
BP goal less than 130/80, hydrate and avoid hypotension  HgA1c  goal less than 5.7%  LDL goal less than 70    Recommend regular physical activity (150 minutes of moderate-intensity weekly) and a low-salt and/or Mediterranean diet.

## 2025-04-30 NOTE — PROGRESS NOTES
DOS: 2025  NAME: Joie Raymundo   : 1945  PCP: Tor Arambula APRN    Chief Complaint   Patient presents with    Stroke      SUBJECTIVE  Neurological Problem:  79 y.o. right-handed female with a past medical history of CAD status post stents, history of MI, hypertension, hyperlipidemia, left ventricular hypertrophy, hypothyroid, prediabetes, COPD, current tobacco use. They are seen in follow up today for TIA versus metabolic encephalopathy, hospital discharge follow-up, however the problem is new to the examiner. Patient last seen by Dr. Gutiérrez in 2025, with a summary of the history taken from the previous note with additions/modifications as indicated. She is accompanied by her granddaughter.    Interval History:   Mrs. Raymundo presented to [facility] 2025 after acute onset of facial droop and slurred speech that was noted by her neighbors.  EMS was called and upon arrival she only had intermittent word finding difficulty.  Last known normal was unknown, she did not receive TNK.  She did undergo team D imaging in the ER.  CTA head/neck were unremarkable. MRI brain without showed no acute concerns however tiny old bilateral cerebellar infarcts and mild small vessel disease seen. She was found to have acute pneumonia and was started on Rocephin.  When seen by our stroke neurologist she felt she was back to her normal neurologic baseline apart from some slight dizziness.  It was felt her episode was encephalopathy secondary to pneumonia versus TIA.    She presents today in follow-up and reports no stroke or TIA-like symptoms, denies visual or speech deficit, facial droop, difficulty swallowing, unilateral extremity weakness or numbness.  She denies any headache or dizziness.  She continues on aspirin and Plavix, was on these prior to her hospitalization in March per cardiology.  She continues on atorvastatin 40 mg nightly, is tolerating this well. Blood pressure today  122/72.  No recent A1c or lipid panel to review.  She has a pack per day smoker, says she has been for 50 years and is not ready to quit.  She denies any signs of sleep apnea: No snoring, no easily drifting off to sleep, no excessive daytime fatigue.    Review of Systems   HENT:  Negative for trouble swallowing.    Eyes:  Negative for visual disturbance.   Respiratory:  Negative for choking.    Musculoskeletal:  Negative for gait problem.   Neurological:  Negative for dizziness, tremors, facial asymmetry, speech difficulty, weakness, light-headedness, numbness and headaches.        The following portions of the patient's history were reviewed and updated as appropriate: allergies, current medications, past family history, past medical history, past social history, past surgical history and problem list.    Current Medications:   Current Outpatient Medications:     amoxicillin-clavulanate (AUGMENTIN) 875-125 MG per tablet, Take 1 tablet by mouth 2 (Two) Times a Day., Disp: 10 tablet, Rfl: 5    aspirin 81 MG EC tablet, Take 1 tablet by mouth Daily., Disp: , Rfl:     atorvastatin (LIPITOR) 40 MG tablet, Take 1 tablet by mouth Daily., Disp: , Rfl:     carvedilol (COREG) 3.125 MG tablet, Take 1 tablet by mouth 2 (Two) Times a Day With Meals., Disp: , Rfl:     clopidogrel (PLAVIX) 75 MG tablet, Take 1 tablet by mouth Daily., Disp: , Rfl:     fexofenadine (ALLEGRA) 180 MG tablet, Take 1 tablet by mouth Daily., Disp: , Rfl:     Fluticasone-Umeclidin-Vilant (TRELEGY) 100-62.5-25 MCG/ACT inhaler, Inhale 1 puff Daily., Disp: , Rfl:     furosemide (LASIX) 40 MG tablet, Take 1 tablet by mouth 3 (Three) Times a Day., Disp: , Rfl:     isosorbide mononitrate (IMDUR) 30 MG 24 hr tablet, Take 1 tablet by mouth Daily., Disp: , Rfl:     Multiple Vitamins-Minerals liquid liquid, Take  by mouth Daily., Disp: , Rfl:     olmesartan (BENICAR) 20 MG tablet, Take 1 tablet by mouth Daily., Disp: , Rfl:     potassium chloride ER (K-TAB) 20 MEQ  "tablet controlled-release ER tablet, Take 1 tablet by mouth Daily., Disp: , Rfl:     Synthroid 125 MCG tablet, Take 1 tablet by mouth Daily., Disp: , Rfl:   **I did not stop or change the above medications.  Patient's medication list was updated to reflect medications they have reported as currently taking, including medication changes made by other providers.    Objective   Vital Signs:  /72   Pulse 52   Ht 152.4 cm (60\")   Wt 74.4 kg (164 lb)   SpO2 94%   BMI 32.03 kg/m²   Body mass index is 32.03 kg/m².    Physical Exam   Physical Exam:  GENERAL: NAD, alert, vital signs reviewed  HEENT: Normocephalic, atraumatic   Resp: Even and unlabored    Neurological:   MS: AOx3, recent/remote memory intact, normal attention/concentration, language intact, no neglect  CN: visual acuity grossly normal, PERRL, EOMI, no nystagmus, no facial droop, no dysarthria,tongue midline, bilateral shoulder shrug symmetric  Motor: Normal tone and bulk. No tremor or abnormal movements noted. No asterixis.   Deltoid: 5/5 right; 5/5 left  Biceps: 5/5 right; 5/5 left  Triceps: 5/5 right; 5/5 left  Left  2+  Right  2+  Iliopsoas: 5/5 right; 5/5 left  Quadriceps: 5/5 right; 5/5 left  Hamstrin/5 right; 5/5 left  Tibialis interior: 5/5 right; 5/5 left  Toe extensors: 5/5 LLE, 5/5 RLE  Toe flexors: 5/5 LLE, 5/5 RLE  Sensory: Intact to crude touch in all four ext.  Coordination: No dysmetria finger to nose   Gait and station: Normal gait and station    Result Review :  The following data was reviewed by: ANA Mims on 2025:  Laboratory Results:         Lab Results   Component Value Date    WBC 10.55 2025    HGB 11.6 (L) 2025    HCT 36.5 2025    MCV 92.4 2025     2025     Lab Results   Component Value Date    GLUCOSE 110 (H) 2025    BUN 39 (H) 2025    CREATININE 1.17 (H) 2025    EGFRIFAFRI >60 2022    BCR 33.3 (H) 2025    K 3.3 (L) 2025    " "CO2 26.1 03/16/2025    CALCIUM 8.8 03/16/2025    ALBUMIN 3.8 03/13/2025    LABIL2 1.2 12/13/2022    AST 18 03/13/2025    ALT 12 03/13/2025     No results found for: \"HGBA1C\"  No results found for: \"CHOL\"  No results found for: \"HDL\"  No results found for: \"LDL\"  No results found for: \"TRIG\"  No results found for: \"RPR\"  No results found for: \"TSH\"  No results found for: \"SLDMNFEM08\"    Data reviewed : Radiologic studies  , Cardiology studies  , Consultant notes  , and Recent hospitalization notes           Assessment and Plan   Diagnoses and all orders for this visit:    1. Cerebrovascular disease (Primary)    2. History of stroke    3. Essential hypertension    4. Mixed hyperlipidemia    5. Prediabetes    6. Tobacco abuse    7. Hospital discharge follow-up      Cerebrovascular disease, h/o bilateral cerebellar infarcts - continue aspirin 81 mg daily for secondary stroke prevention. Okay for monotherapy from Neuro standpoint.  HTN: /72 in clinic today.  Recommend home monitoring, staying well-hydrated and avoiding hypotension.  HLD: LDL no recent to review.  Continue atorvastatin 40 mg nightly.  Recommend continued surveillance with PCP.  Prediabetic: A1c no recent to review.  Recommend continued surveillance with PCP.  IMELDA: Discussed risks of untreated sleep apnea including atrial fibrillation, stroke, heart attack, hypertension, DM II.  Tobacco dependence: Patient not ready to quit. Risks discussed.     Encouraged moderate physical activity 150 min a week, healthy diet (e.g. Mediterranean, Heart-Healthy).  Secondary stroke prevention: Ideal targets for stroke prevention would be Blood pressure < 130/80; B12 > 500, TSH in normal range, LDL < 70, HbA1c <5.7% and complete smoking cessation.Call 911 for stroke symptoms.    We will see Joie back if, sooner if symptoms warrant.     ANA Mims  [facility]   04/30/25       I spent 40 minutes caring for Joie on this date of service. This time " includes time spent by me in the following activities:preparing for the visit, reviewing tests, obtaining and/or reviewing a separately obtained history, performing a medically appropriate examination and/or evaluation , counseling and educating the patient/family/caregiver, ordering medications, tests, or procedures, referring and communicating with other health care professionals , documenting information in the medical record, independently interpreting results and communicating that information with the patient/family/caregiver, and care coordination  Follow Up   Return if symptoms worsen or fail to improve.  Patient was given instructions and counseling regarding her condition or for health maintenance advice. Please see specific information pulled into the AVS if appropriate.       Dictated using Dragon Dictation    As of April 2021, as required by the Federal 21st Century Cures Act, medical records (including provider notes and laboratory/imaging results) are to be made available to patient’s and/or their designees as soon as the documents are signed/resulted. While the intention is to ensure transparency and to engage the patient in their healthcare, this immediate access may create unintended consequences as this document uses language intended for communication between medical experts and diagnostic results are interpreted with the entirety of the patient’s clinical picture in mind. It is recommended that patients and/or their designees review all available information with their primary or specialist providers for explanation and guidance to avoid misinterpretation based on layperson understanding, non-medical expert opinions, or Internet searches.